# Patient Record
Sex: MALE | Race: WHITE | Employment: OTHER | URBAN - NONMETROPOLITAN AREA
[De-identification: names, ages, dates, MRNs, and addresses within clinical notes are randomized per-mention and may not be internally consistent; named-entity substitution may affect disease eponyms.]

---

## 2018-08-18 ENCOUNTER — APPOINTMENT (OUTPATIENT)
Dept: CT IMAGING | Age: 78
End: 2018-08-18
Payer: MEDICARE

## 2018-08-18 ENCOUNTER — HOSPITAL ENCOUNTER (EMERGENCY)
Age: 78
Discharge: HOME | End: 2018-08-18
Attending: EMERGENCY MEDICINE
Payer: MEDICARE

## 2018-08-18 VITALS
TEMPERATURE: 98.4 F | DIASTOLIC BLOOD PRESSURE: 76 MMHG | OXYGEN SATURATION: 99 % | HEART RATE: 57 BPM | WEIGHT: 182 LBS | RESPIRATION RATE: 16 BRPM | SYSTOLIC BLOOD PRESSURE: 149 MMHG

## 2018-08-18 DIAGNOSIS — N20.0 KIDNEY STONE: Primary | ICD-10-CM

## 2018-08-18 LAB
-: ABNORMAL
ABSOLUTE EOS #: 0.27 K/UL (ref 0–0.44)
ABSOLUTE IMMATURE GRANULOCYTE: <0.03 K/UL (ref 0–0.3)
ABSOLUTE LYMPH #: 0.78 K/UL (ref 1.1–3.7)
ABSOLUTE MONO #: 0.31 K/UL (ref 0.1–1.2)
ALBUMIN SERPL-MCNC: 3.7 G/DL (ref 3.5–5.2)
ALBUMIN/GLOBULIN RATIO: 1.3 (ref 1–2.5)
ALP BLD-CCNC: 70 U/L (ref 40–129)
ALT SERPL-CCNC: <5 U/L (ref 5–41)
AMORPHOUS: ABNORMAL
ANION GAP SERPL CALCULATED.3IONS-SCNC: 12 MMOL/L (ref 9–17)
AST SERPL-CCNC: 13 U/L
BACTERIA: ABNORMAL
BASOPHILS # BLD: 1 % (ref 0–2)
BASOPHILS ABSOLUTE: 0.03 K/UL (ref 0–0.2)
BILIRUB SERPL-MCNC: 0.37 MG/DL (ref 0.3–1.2)
BILIRUBIN URINE: NEGATIVE
BUN BLDV-MCNC: 24 MG/DL (ref 8–23)
BUN/CREAT BLD: 12 (ref 9–20)
CALCIUM SERPL-MCNC: 8.5 MG/DL (ref 8.6–10.4)
CASTS UA: ABNORMAL /LPF
CHLORIDE BLD-SCNC: 104 MMOL/L (ref 98–107)
CO2: 24 MMOL/L (ref 20–31)
COLOR: YELLOW
COMMENT UA: ABNORMAL
CREAT SERPL-MCNC: 1.98 MG/DL (ref 0.7–1.2)
CRYSTALS, UA: ABNORMAL /HPF
DIFFERENTIAL TYPE: ABNORMAL
EOSINOPHILS RELATIVE PERCENT: 4 % (ref 1–4)
EPITHELIAL CELLS UA: ABNORMAL /HPF (ref 0–5)
GFR AFRICAN AMERICAN: 40 ML/MIN
GFR NON-AFRICAN AMERICAN: 33 ML/MIN
GFR SERPL CREATININE-BSD FRML MDRD: ABNORMAL ML/MIN/{1.73_M2}
GFR SERPL CREATININE-BSD FRML MDRD: ABNORMAL ML/MIN/{1.73_M2}
GLUCOSE BLD-MCNC: 163 MG/DL (ref 70–99)
GLUCOSE URINE: NEGATIVE
HCT VFR BLD CALC: 36.3 % (ref 40.7–50.3)
HEMOGLOBIN: 12.1 G/DL (ref 13–17)
IMMATURE GRANULOCYTES: 0 %
KETONES, URINE: NEGATIVE
LEUKOCYTE ESTERASE, URINE: NEGATIVE
LIPASE: 57 U/L (ref 13–60)
LYMPHOCYTES # BLD: 13 % (ref 24–43)
MCH RBC QN AUTO: 28.1 PG (ref 25.2–33.5)
MCHC RBC AUTO-ENTMCNC: 33.3 G/DL (ref 28.4–34.8)
MCV RBC AUTO: 84.4 FL (ref 82.6–102.9)
MONOCYTES # BLD: 5 % (ref 3–12)
MUCUS: ABNORMAL
NITRITE, URINE: NEGATIVE
NRBC AUTOMATED: 0 PER 100 WBC
OTHER OBSERVATIONS UA: ABNORMAL
PDW BLD-RTO: 13.5 % (ref 11.8–14.4)
PH UA: 6 (ref 5–9)
PLATELET # BLD: 234 K/UL (ref 138–453)
PLATELET ESTIMATE: ABNORMAL
PMV BLD AUTO: 9.1 FL (ref 8.1–13.5)
POTASSIUM SERPL-SCNC: 3.6 MMOL/L (ref 3.7–5.3)
PROTEIN UA: NEGATIVE
RBC # BLD: 4.3 M/UL (ref 4.21–5.77)
RBC # BLD: ABNORMAL 10*6/UL
RBC UA: ABNORMAL /HPF (ref 0–2)
RENAL EPITHELIAL, UA: ABNORMAL /HPF
SEG NEUTROPHILS: 77 % (ref 36–65)
SEGMENTED NEUTROPHILS ABSOLUTE COUNT: 4.85 K/UL (ref 1.5–8.1)
SODIUM BLD-SCNC: 140 MMOL/L (ref 135–144)
SPECIFIC GRAVITY UA: >1.03 (ref 1.01–1.02)
TOTAL PROTEIN: 6.6 G/DL (ref 6.4–8.3)
TRICHOMONAS: ABNORMAL
TURBIDITY: CLEAR
URINE HGB: ABNORMAL
UROBILINOGEN, URINE: NORMAL
WBC # BLD: 6.3 K/UL (ref 3.5–11.3)
WBC # BLD: ABNORMAL 10*3/UL
WBC UA: ABNORMAL /HPF (ref 0–5)
YEAST: ABNORMAL

## 2018-08-18 PROCEDURE — 6370000000 HC RX 637 (ALT 250 FOR IP): Performed by: EMERGENCY MEDICINE

## 2018-08-18 PROCEDURE — 87186 SC STD MICRODIL/AGAR DIL: CPT

## 2018-08-18 PROCEDURE — 87077 CULTURE AEROBIC IDENTIFY: CPT

## 2018-08-18 PROCEDURE — 96374 THER/PROPH/DIAG INJ IV PUSH: CPT

## 2018-08-18 PROCEDURE — 2580000003 HC RX 258: Performed by: EMERGENCY MEDICINE

## 2018-08-18 PROCEDURE — 99284 EMERGENCY DEPT VISIT MOD MDM: CPT

## 2018-08-18 PROCEDURE — 6360000002 HC RX W HCPCS: Performed by: EMERGENCY MEDICINE

## 2018-08-18 PROCEDURE — 30300 REMOVE NASAL FOREIGN BODY: CPT

## 2018-08-18 PROCEDURE — 87086 URINE CULTURE/COLONY COUNT: CPT

## 2018-08-18 PROCEDURE — 80053 COMPREHEN METABOLIC PANEL: CPT

## 2018-08-18 PROCEDURE — 36415 COLL VENOUS BLD VENIPUNCTURE: CPT

## 2018-08-18 PROCEDURE — 85025 COMPLETE CBC W/AUTO DIFF WBC: CPT

## 2018-08-18 PROCEDURE — 83690 ASSAY OF LIPASE: CPT

## 2018-08-18 PROCEDURE — 81001 URINALYSIS AUTO W/SCOPE: CPT

## 2018-08-18 PROCEDURE — 74176 CT ABD & PELVIS W/O CONTRAST: CPT

## 2018-08-18 RX ORDER — SIMVASTATIN 40 MG
40 TABLET ORAL NIGHTLY
COMMUNITY
End: 2022-05-28

## 2018-08-18 RX ORDER — LISINOPRIL AND HYDROCHLOROTHIAZIDE 25; 20 MG/1; MG/1
1 TABLET ORAL DAILY
COMMUNITY
End: 2022-05-28

## 2018-08-18 RX ORDER — TAMSULOSIN HYDROCHLORIDE 0.4 MG/1
0.4 CAPSULE ORAL DAILY
Qty: 5 CAPSULE | Refills: 0 | Status: SHIPPED | OUTPATIENT
Start: 2018-08-18 | End: 2018-09-28 | Stop reason: ALTCHOICE

## 2018-08-18 RX ORDER — 0.9 % SODIUM CHLORIDE 0.9 %
1000 INTRAVENOUS SOLUTION INTRAVENOUS ONCE
Status: COMPLETED | OUTPATIENT
Start: 2018-08-18 | End: 2018-08-18

## 2018-08-18 RX ORDER — KETOROLAC TROMETHAMINE 15 MG/ML
15 INJECTION, SOLUTION INTRAMUSCULAR; INTRAVENOUS ONCE
Status: COMPLETED | OUTPATIENT
Start: 2018-08-18 | End: 2018-08-18

## 2018-08-18 RX ORDER — MELOXICAM 15 MG/1
15 TABLET ORAL DAILY
COMMUNITY
End: 2022-05-28

## 2018-08-18 RX ORDER — HYDROCODONE BITARTRATE AND ACETAMINOPHEN 5; 325 MG/1; MG/1
1 TABLET ORAL ONCE
Status: COMPLETED | OUTPATIENT
Start: 2018-08-18 | End: 2018-08-18

## 2018-08-18 RX ORDER — METOPROLOL TARTRATE 100 MG/1
100 TABLET ORAL 2 TIMES DAILY
COMMUNITY
End: 2022-05-28

## 2018-08-18 RX ORDER — ACETAMINOPHEN 500 MG
1000 TABLET ORAL DAILY
COMMUNITY
End: 2022-05-28

## 2018-08-18 RX ORDER — ONDANSETRON 4 MG/1
4 TABLET, ORALLY DISINTEGRATING ORAL EVERY 8 HOURS PRN
Qty: 15 TABLET | Refills: 0 | Status: SHIPPED | OUTPATIENT
Start: 2018-08-18 | End: 2022-05-28

## 2018-08-18 RX ORDER — HYDROCODONE BITARTRATE AND ACETAMINOPHEN 5; 325 MG/1; MG/1
1 TABLET ORAL EVERY 6 HOURS PRN
Qty: 10 TABLET | Refills: 0 | Status: SHIPPED | OUTPATIENT
Start: 2018-08-18 | End: 2018-08-21

## 2018-08-18 RX ORDER — OMEPRAZOLE 20 MG/1
20 CAPSULE, DELAYED RELEASE ORAL DAILY
COMMUNITY
End: 2022-05-28

## 2018-08-18 RX ADMIN — HYDROCODONE BITARTRATE AND ACETAMINOPHEN 1 TABLET: 5; 325 TABLET ORAL at 13:28

## 2018-08-18 RX ADMIN — KETOROLAC TROMETHAMINE 15 MG: 15 INJECTION, SOLUTION INTRAMUSCULAR; INTRAVENOUS at 10:29

## 2018-08-18 RX ADMIN — SODIUM CHLORIDE 1000 ML: 9 INJECTION, SOLUTION INTRAVENOUS at 10:28

## 2018-08-18 ASSESSMENT — ENCOUNTER SYMPTOMS
RESPIRATORY NEGATIVE: 1
GASTROINTESTINAL NEGATIVE: 1

## 2018-08-18 ASSESSMENT — PAIN DESCRIPTION - ORIENTATION: ORIENTATION: LEFT

## 2018-08-18 ASSESSMENT — PAIN DESCRIPTION - LOCATION: LOCATION: FLANK

## 2018-08-18 ASSESSMENT — PAIN - FUNCTIONAL ASSESSMENT: PAIN_FUNCTIONAL_ASSESSMENT: 0-10

## 2018-08-18 ASSESSMENT — PAIN DESCRIPTION - PAIN TYPE: TYPE: ACUTE PAIN

## 2018-08-18 ASSESSMENT — PAIN SCALES - GENERAL
PAINLEVEL_OUTOF10: 4
PAINLEVEL_OUTOF10: 6
PAINLEVEL_OUTOF10: 3
PAINLEVEL_OUTOF10: 6

## 2018-08-18 ASSESSMENT — PAIN DESCRIPTION - DESCRIPTORS: DESCRIPTORS: SHARP

## 2018-08-18 NOTE — ED PROVIDER NOTES
[facility] ED  eMERGENCY dEPARTMENT eNCOUnter      Pt Name: Dianne Elam  MRN: [id number]  Birthdate 1940  Date of evaluation: 8/18/2018  Provider: Darylene Sprain, MD    CHIEF COMPLAINT       Chief Complaint   Patient presents with    Flank Pain     left sided, onset last night         HISTORY OF PRESENT ILLNESS   (Location/Symptom, Timing/Onset, Context/Setting, Quality, Duration, Modifying Factors, Severity)  Note limiting factors. Dianne Elam is a 69 y.o. male who presents to the emergency department      HPI  70 yo male, multiple history of kidney stones, last one @ 7 years. Told he had 7 stones still in his kidney. No fever. No vomiting.  + nausea. NO hematuria. Pain started last night. Nursing Notes were reviewed. REVIEW OF SYSTEMS    (2-9 systems for level 4, 10 or more for level 5)     Review of Systems   Constitutional: Negative. Respiratory: Negative. Cardiovascular: Negative. Gastrointestinal: Negative. Genitourinary: Positive for flank pain. Negative for difficulty urinating, hematuria, scrotal swelling, testicular pain and urgency. Neurological: Negative. Hematological: Negative. Except as noted above the remainder of the review of systems was reviewed and negative.        PAST MEDICAL HISTORY     Past Medical History:   Diagnosis Date    Arthritis     Cancer ([facility] [address]     prostate    Diabetes mellitus ([facility] [address]     Hyperlipidemia     Hypertension     Kidney stone          SURGICAL HISTORY       Past Surgical History:   Procedure Laterality Date    APPENDECTOMY      COLON SURGERY           CURRENT MEDICATIONS       Discharge Medication List as of 8/18/2018 12:45 PM      CONTINUE these medications which have NOT CHANGED    Details   lisinopril-hydrochlorothiazide (PRINZIDE;ZESTORETIC) 20-25 MG per tablet Take 1 tablet by mouth dailyHistorical Med      simvastatin (ZOCOR) 40 MG tablet Take 40 mg by mouth nightlyHistorical Med      metoprolol (LOPRESSOR) 100 MG tablet Take 100 mg by mouth 2 times dailyHistorical Med      omeprazole (PRILOSEC) 20 MG delayed release capsule Take 20 mg by mouth DailyHistorical Med      meloxicam (MOBIC) 15 MG tablet Take 15 mg by mouth dailyHistorical Med      acetaminophen (TYLENOL) 500 MG tablet Take 1,000 mg by mouth dailyHistorical Med             ALLERGIES     Darvon [propoxyphene]; Fentanyl; Morphine; and Pcn [penicillins]    FAMILY HISTORY     History reviewed. No pertinent family history. SOCIAL HISTORY       Social History     Social History    Marital status:      Spouse name: N/A    Number of children: N/A    Years of education: N/A     Social History Main Topics    Smoking status: Never Smoker    Smokeless tobacco: Never Used    Alcohol use None    Drug use: Unknown    Sexual activity: Not Asked     Other Topics Concern    None     Social History Narrative    None       SCREENINGS             PHYSICAL EXAM    (up to 7 for level 4, 8 or more for level 5)     ED Triage Vitals   BP Temp Temp Source Pulse Resp SpO2 Height Weight   08/18/18 0928 08/18/18 0926 08/18/18 0926 08/18/18 0926 08/18/18 0926 08/18/18 0926 -- 08/18/18 0926   (!) 166/69 98.4 °F (36.9 °C) Tympanic 57 16 99 %  182 lb (82.6 kg)       Physical Exam   Constitutional: He is oriented to person, place, and time. He appears well-developed and well-nourished. HENT:   Head: Normocephalic and atraumatic. Right Ear: External ear normal.   Left Ear: External ear normal.   Nose: Nose normal.   Eyes: Pupils are equal, round, and reactive to light. Conjunctivae and EOM are normal.   Neck: Normal range of motion. Neck supple. Cardiovascular: Normal rate, regular rhythm, normal heart sounds and intact distal pulses. Pulmonary/Chest: Effort normal and breath sounds normal.   Abdominal: Soft. Bowel sounds are normal. There is no tenderness (left flank and llq pain). Musculoskeletal: Normal range of motion.    Neurological: He is alert and oriented to person, place, and time. Skin: Skin is warm and dry. DIAGNOSTIC RESULTS     EKG: All EKG's are interpreted by the Emergency Department Physician who either signs or Co-signs this chart in the absence of a cardiologist.      RADIOLOGY:   Non-plain film images such as CT, Ultrasound and MRI are read by the radiologist. Plain radiographic images are visualized and preliminarily interpreted by the emergency physician with the below findings:      Interpretation per the Radiologist below, if available at the time of this note:    CT ABDOMEN PELVIS WO CONTRAST   Final Result   Impression:     3.8 millimeter proximal left ureteral calcification with mild left-sided hydroureteronephrosis. Bilateral nephrolithiasis. Bladder calcification may represent recently passed stone versus primary bladder calcification. Consider urologic consultation. Three vessel coronary artery disease.           Electronically Signed By: Issa Don   on  08/18/2018  10:47            ED BEDSIDE ULTRASOUND:   Performed by ED Physician - none    LABS:  Labs Reviewed   URINE CULTURE - Abnormal; Notable for the following:        Result Value    Culture ESCHERICHIA COLI 10 to 50,000 CFU/ML (*)     All other components within normal limits   CBC WITH AUTO DIFFERENTIAL - Abnormal; Notable for the following:     Hemoglobin 12.1 (*)     Hematocrit 36.3 (*)     Seg Neutrophils 77 (*)     Lymphocytes 13 (*)     Absolute Lymph # 0.78 (*)     All other components within normal limits   COMPREHENSIVE METABOLIC PANEL - Abnormal; Notable for the following:     Glucose 163 (*)     BUN 24 (*)     CREATININE 1.98 (*)     Calcium 8.5 (*)     Potassium 3.6 (*)     ALT <5 (*)     GFR Non- 33 (*)     GFR  40 (*)     All other components within normal limits   URINALYSIS WITH MICROSCOPIC - Abnormal; Notable for the following:     Specific Gravity, UA >1.030 (*)     Urine Hgb 2+ (*)     All dose possible to manage pain., Disp-10 tablet, R-0Print      ondansetron (ZOFRAN ODT) 4 MG disintegrating tablet Take 1 tablet by mouth every 8 hours as needed for Nausea or Vomiting, Disp-15 tablet, R-0Print      tamsulosin (FLOMAX) 0.4 MG capsule Take 1 capsule by mouth daily for 5 doses, Disp-5 capsule, R-0Print             Follow-up:  Lenore Knowles MD  [facility] [phone]              Final Impression:   1.  Kidney stone               (Please note that portions of this note were completed with a voice recognition program.  Efforts were made to edit the dictations but occasionally words are mis-transcribed.)           Harleen Perez MD  08/18/18 [address], [place]  08/26/18 8498

## 2018-08-19 LAB
CULTURE: ABNORMAL
Lab: ABNORMAL
ORGANISM: ABNORMAL
SPECIMEN DESCRIPTION: ABNORMAL
STATUS: ABNORMAL

## 2018-08-20 ENCOUNTER — OFFICE VISIT (OUTPATIENT)
Dept: UROLOGY | Age: 78
End: 2018-08-20
Payer: MEDICARE

## 2018-08-20 ENCOUNTER — HOSPITAL ENCOUNTER (OUTPATIENT)
Age: 78
Discharge: HOME | End: 2018-08-20
Payer: MEDICARE

## 2018-08-20 VITALS
DIASTOLIC BLOOD PRESSURE: 72 MMHG | BODY MASS INDEX: 31.76 KG/M2 | HEIGHT: 64 IN | WEIGHT: 186 LBS | SYSTOLIC BLOOD PRESSURE: 130 MMHG

## 2018-08-20 DIAGNOSIS — N20.0 RENAL CALCULUS: Primary | ICD-10-CM

## 2018-08-20 DIAGNOSIS — N30.00 ACUTE CYSTITIS WITHOUT HEMATURIA: ICD-10-CM

## 2018-08-20 LAB
ANION GAP SERPL CALCULATED.3IONS-SCNC: 9 MMOL/L (ref 9–17)
BUN BLDV-MCNC: 22 MG/DL (ref 8–23)
BUN/CREAT BLD: 11 (ref 9–20)
CALCIUM SERPL-MCNC: 8.1 MG/DL (ref 8.6–10.4)
CHLORIDE BLD-SCNC: 105 MMOL/L (ref 98–107)
CO2: 27 MMOL/L (ref 20–31)
CREAT SERPL-MCNC: 1.97 MG/DL (ref 0.7–1.2)
GFR AFRICAN AMERICAN: 40 ML/MIN
GFR NON-AFRICAN AMERICAN: 33 ML/MIN
GFR SERPL CREATININE-BSD FRML MDRD: ABNORMAL ML/MIN/{1.73_M2}
GFR SERPL CREATININE-BSD FRML MDRD: ABNORMAL ML/MIN/{1.73_M2}
GLUCOSE BLD-MCNC: 103 MG/DL (ref 70–99)
POTASSIUM SERPL-SCNC: 3.6 MMOL/L (ref 3.7–5.3)
SODIUM BLD-SCNC: 141 MMOL/L (ref 135–144)

## 2018-08-20 PROCEDURE — 1123F ACP DISCUSS/DSCN MKR DOCD: CPT | Performed by: UROLOGY

## 2018-08-20 PROCEDURE — 4040F PNEUMOC VAC/ADMIN/RCVD: CPT | Performed by: UROLOGY

## 2018-08-20 PROCEDURE — 99204 OFFICE O/P NEW MOD 45 MIN: CPT | Performed by: UROLOGY

## 2018-08-20 PROCEDURE — G8417 CALC BMI ABV UP PARAM F/U: HCPCS | Performed by: UROLOGY

## 2018-08-20 PROCEDURE — 1036F TOBACCO NON-USER: CPT | Performed by: UROLOGY

## 2018-08-20 PROCEDURE — 80048 BASIC METABOLIC PNL TOTAL CA: CPT

## 2018-08-20 PROCEDURE — G8427 DOCREV CUR MEDS BY ELIG CLIN: HCPCS | Performed by: UROLOGY

## 2018-08-20 PROCEDURE — 36415 COLL VENOUS BLD VENIPUNCTURE: CPT

## 2018-08-20 PROCEDURE — 1101F PT FALLS ASSESS-DOCD LE1/YR: CPT | Performed by: UROLOGY

## 2018-08-20 RX ORDER — CEPHALEXIN 500 MG/1
500 CAPSULE ORAL 3 TIMES DAILY
Qty: 21 CAPSULE | Refills: 0 | Status: SHIPPED | OUTPATIENT
Start: 2018-08-20 | End: 2018-08-27

## 2018-08-20 ASSESSMENT — ENCOUNTER SYMPTOMS
WHEEZING: 0
EYE REDNESS: 0
COUGH: 0
SHORTNESS OF BREATH: 0
VOMITING: 0
EYE PAIN: 0
NAUSEA: 0
COLOR CHANGE: 0
BACK PAIN: 0
ABDOMINAL PAIN: 0

## 2018-08-20 NOTE — PROGRESS NOTES
mouth 2 times daily      omeprazole (PRILOSEC) 20 MG delayed release capsule Take 20 mg by mouth Daily      meloxicam (MOBIC) 15 MG tablet Take 15 mg by mouth daily      acetaminophen (TYLENOL) 500 MG tablet Take 1,000 mg by mouth daily      HYDROcodone-acetaminophen (NORCO) 5-325 MG per tablet Take 1 tablet by mouth every 6 hours as needed for Pain for up to 3 days. Intended supply: 3 days. Take lowest dose possible to manage pain. 10 tablet 0    ondansetron (ZOFRAN ODT) 4 MG disintegrating tablet Take 1 tablet by mouth every 8 hours as needed for Nausea or Vomiting 15 tablet 0    tamsulosin (FLOMAX) 0.4 MG capsule Take 1 capsule by mouth daily for 5 doses 5 capsule 0     No current facility-administered medications on file prior to visit. Outpatient Encounter Prescriptions as of 8/20/2018   Medication Sig Dispense Refill    lisinopril-hydrochlorothiazide (PRINZIDE;ZESTORETIC) 20-25 MG per tablet Take 1 tablet by mouth daily      simvastatin (ZOCOR) 40 MG tablet Take 40 mg by mouth nightly      metoprolol (LOPRESSOR) 100 MG tablet Take 100 mg by mouth 2 times daily      omeprazole (PRILOSEC) 20 MG delayed release capsule Take 20 mg by mouth Daily      meloxicam (MOBIC) 15 MG tablet Take 15 mg by mouth daily      acetaminophen (TYLENOL) 500 MG tablet Take 1,000 mg by mouth daily      HYDROcodone-acetaminophen (NORCO) 5-325 MG per tablet Take 1 tablet by mouth every 6 hours as needed for Pain for up to 3 days. Intended supply: 3 days. Take lowest dose possible to manage pain. 10 tablet 0    ondansetron (ZOFRAN ODT) 4 MG disintegrating tablet Take 1 tablet by mouth every 8 hours as needed for Nausea or Vomiting 15 tablet 0    tamsulosin (FLOMAX) 0.4 MG capsule Take 1 capsule by mouth daily for 5 doses 5 capsule 0     No facility-administered encounter medications on file as of 8/20/2018. Darvon [propoxyphene];  Fentanyl; Morphine; and Pcn [penicillins]  History   Smoking Status    Never Smoker   Smokeless Tobacco    Never Used       History   Alcohol use Not on file       There were no vitals filed for this visit. Constitutional: Patient in no acute distress; Neuro: alert and oriented to person place and time. Psych: Mood and affect normal.  Skin: Normal  Lungs: Respiratory effort normal  Cardiovascular:  Normal peripheral pulses  Abdomen: Soft, non-tender, non-distended with no CVA, flank pain, hepatosplenomegaly or hernia. Kidneys normal.  Bladder non-tender and not distended. Lymphatics: no palpable lymphadenopathy  Penis normal  Urethral meatus normal  Scrotal exam normal  Testicles normal bilaterally  Epididymis normal bilaterally  No evidence of inguinal hernia       No results found for: PSA  Lab Results   Component Value Date    BUN 24 (H) 08/18/2018     Lab Results   Component Value Date    CREATININE 1.98 (H) 08/18/2018       No results found for this visit on 08/20/18. Review of Systems   Constitutional: Negative for appetite change, chills and fever. Eyes: Negative for pain, redness and visual disturbance. Respiratory: Negative for cough, shortness of breath and wheezing. Cardiovascular: Negative for chest pain and leg swelling. Gastrointestinal: Negative for abdominal pain, nausea and vomiting. Genitourinary: Negative for difficulty urinating, discharge, dysuria, flank pain, frequency, hematuria, scrotal swelling and testicular pain. Musculoskeletal: Negative for back pain, joint swelling and myalgias. Skin: Negative for color change, rash and wound. Neurological: Negative for dizziness, tremors and numbness. Hematological: Negative for adenopathy. Does not bruise/bleed easily. Objective:   Physical Exam    Assessment: This is a 69 y.o. male with the following diagnoses:   Diagnosis Orders   1. Renal calculus  XR ABDOMEN (KUB) (SINGLE AP VIEW)   2.  Acute cystitis without hematuria  cephALEXin (KEFLEX) 500 MG capsule         Plan:      We did

## 2018-09-28 ENCOUNTER — HOSPITAL ENCOUNTER (OUTPATIENT)
Age: 78
Discharge: HOME | End: 2018-09-30
Payer: MEDICARE

## 2018-09-28 ENCOUNTER — OFFICE VISIT (OUTPATIENT)
Dept: UROLOGY | Age: 78
End: 2018-09-28
Payer: MEDICARE

## 2018-09-28 ENCOUNTER — HOSPITAL ENCOUNTER (OUTPATIENT)
Age: 78
Setting detail: SPECIMEN
Discharge: HOME | End: 2018-09-28
Payer: MEDICARE

## 2018-09-28 ENCOUNTER — HOSPITAL ENCOUNTER (OUTPATIENT)
Dept: GENERAL RADIOLOGY | Age: 78
Discharge: HOME | End: 2018-09-30
Payer: MEDICARE

## 2018-09-28 ENCOUNTER — TELEPHONE (OUTPATIENT)
Dept: UROLOGY | Age: 78
End: 2018-09-28

## 2018-09-28 VITALS
WEIGHT: 184 LBS | TEMPERATURE: 97.5 F | DIASTOLIC BLOOD PRESSURE: 62 MMHG | BODY MASS INDEX: 31.58 KG/M2 | SYSTOLIC BLOOD PRESSURE: 128 MMHG

## 2018-09-28 DIAGNOSIS — N20.0 RENAL STONE: ICD-10-CM

## 2018-09-28 DIAGNOSIS — N20.0 RENAL CALCULUS: ICD-10-CM

## 2018-09-28 DIAGNOSIS — N20.0 RENAL STONE: Primary | ICD-10-CM

## 2018-09-28 LAB
-: ABNORMAL
AMORPHOUS: ABNORMAL
BACTERIA: ABNORMAL
BILIRUBIN URINE: NEGATIVE
CASTS UA: ABNORMAL /LPF
COLOR: YELLOW
COMMENT UA: ABNORMAL
CRYSTALS, UA: ABNORMAL /HPF
EPITHELIAL CELLS UA: ABNORMAL /HPF (ref 0–5)
GLUCOSE URINE: NEGATIVE
KETONES, URINE: NEGATIVE
LEUKOCYTE ESTERASE, URINE: NEGATIVE
MUCUS: ABNORMAL
NITRITE, URINE: NEGATIVE
OTHER OBSERVATIONS UA: ABNORMAL
PH UA: 5.5 (ref 5–9)
PROTEIN UA: NEGATIVE
RBC UA: ABNORMAL /HPF (ref 0–2)
RENAL EPITHELIAL, UA: ABNORMAL /HPF
SPECIFIC GRAVITY UA: >1.03 (ref 1.01–1.02)
TRICHOMONAS: ABNORMAL
TURBIDITY: CLEAR
URINE HGB: NEGATIVE
UROBILINOGEN, URINE: NORMAL
WBC UA: ABNORMAL /HPF (ref 0–5)
YEAST: ABNORMAL

## 2018-09-28 PROCEDURE — G8427 DOCREV CUR MEDS BY ELIG CLIN: HCPCS | Performed by: NURSE PRACTITIONER

## 2018-09-28 PROCEDURE — 87186 SC STD MICRODIL/AGAR DIL: CPT

## 2018-09-28 PROCEDURE — 1036F TOBACCO NON-USER: CPT | Performed by: NURSE PRACTITIONER

## 2018-09-28 PROCEDURE — 87086 URINE CULTURE/COLONY COUNT: CPT

## 2018-09-28 PROCEDURE — 1123F ACP DISCUSS/DSCN MKR DOCD: CPT | Performed by: NURSE PRACTITIONER

## 2018-09-28 PROCEDURE — 87077 CULTURE AEROBIC IDENTIFY: CPT

## 2018-09-28 PROCEDURE — 99213 OFFICE O/P EST LOW 20 MIN: CPT | Performed by: NURSE PRACTITIONER

## 2018-09-28 PROCEDURE — 81001 URINALYSIS AUTO W/SCOPE: CPT

## 2018-09-28 PROCEDURE — G8417 CALC BMI ABV UP PARAM F/U: HCPCS | Performed by: NURSE PRACTITIONER

## 2018-09-28 PROCEDURE — 1101F PT FALLS ASSESS-DOCD LE1/YR: CPT | Performed by: NURSE PRACTITIONER

## 2018-09-28 PROCEDURE — 4040F PNEUMOC VAC/ADMIN/RCVD: CPT | Performed by: NURSE PRACTITIONER

## 2018-09-28 PROCEDURE — 74018 RADEX ABDOMEN 1 VIEW: CPT

## 2018-09-28 ASSESSMENT — ENCOUNTER SYMPTOMS
EYE PAIN: 0
COUGH: 0
SHORTNESS OF BREATH: 0
ABDOMINAL PAIN: 0
BACK PAIN: 0
CONSTIPATION: 0
COLOR CHANGE: 0
EYE REDNESS: 0
WHEEZING: 0
NAUSEA: 0
VOMITING: 0

## 2018-09-28 NOTE — PROGRESS NOTES
tablet Take 1 tablet by mouth daily      simvastatin (ZOCOR) 40 MG tablet Take 40 mg by mouth nightly      metoprolol (LOPRESSOR) 100 MG tablet Take 100 mg by mouth 2 times daily      omeprazole (PRILOSEC) 20 MG delayed release capsule Take 20 mg by mouth Daily      meloxicam (MOBIC) 15 MG tablet Take 15 mg by mouth daily      acetaminophen (TYLENOL) 500 MG tablet Take 1,000 mg by mouth daily      ondansetron (ZOFRAN ODT) 4 MG disintegrating tablet Take 1 tablet by mouth every 8 hours as needed for Nausea or Vomiting 15 tablet 0    tamsulosin (FLOMAX) 0.4 MG capsule Take 1 capsule by mouth daily for 5 doses 5 capsule 0     No current facility-administered medications on file prior to visit. Outpatient Encounter Prescriptions as of 9/28/2018   Medication Sig Dispense Refill    lisinopril-hydrochlorothiazide (PRINZIDE;ZESTORETIC) 20-25 MG per tablet Take 1 tablet by mouth daily      simvastatin (ZOCOR) 40 MG tablet Take 40 mg by mouth nightly      metoprolol (LOPRESSOR) 100 MG tablet Take 100 mg by mouth 2 times daily      omeprazole (PRILOSEC) 20 MG delayed release capsule Take 20 mg by mouth Daily      meloxicam (MOBIC) 15 MG tablet Take 15 mg by mouth daily      acetaminophen (TYLENOL) 500 MG tablet Take 1,000 mg by mouth daily      ondansetron (ZOFRAN ODT) 4 MG disintegrating tablet Take 1 tablet by mouth every 8 hours as needed for Nausea or Vomiting 15 tablet 0    tamsulosin (FLOMAX) 0.4 MG capsule Take 1 capsule by mouth daily for 5 doses 5 capsule 0     No facility-administered encounter medications on file as of 9/28/2018. Darvon [propoxyphene]; Fentanyl; Morphine; and Pcn [penicillins]  History   Smoking Status    Never Smoker   Smokeless Tobacco    Never Used       History   Alcohol use Not on file       Vitals:    09/28/18 0835   BP: 128/62   Temp: 97.5 °F (36.4 °C)       Constitutional: Patient in no acute distress; Neuro: alert and oriented to person place and time. Psych: Mood and affect normal.  Skin: Normal  Lungs: Respiratory effort normal  Cardiovascular:  Normal peripheral pulses  Abdomen: Soft, non-tender, non-distended with no CVA, flank pain, hepatosplenomegaly or hernia. Kidneys normal.  Bladder non-tender and not distended. Lymphatics: no palpable lymphadenopathy  Penis normal  Urethral meatus normal  Scrotal exam normal  Testicles normal bilaterally      No results found for: PSA  Lab Results   Component Value Date    BUN 22 08/20/2018     Lab Results   Component Value Date    CREATININE 1.97 (H) 08/20/2018       No results found for this visit on 09/28/18. Review of Systems   Constitutional: Negative for appetite change, chills and fever. Eyes: Negative for pain, redness and visual disturbance. Respiratory: Negative for cough, shortness of breath and wheezing. Cardiovascular: Negative for chest pain and leg swelling. Gastrointestinal: Negative for abdominal pain, constipation, nausea and vomiting. Genitourinary: Positive for enuresis (nocturia) and frequency. Negative for decreased urine volume, difficulty urinating, discharge, dysuria, flank pain, hematuria, penile pain, scrotal swelling, testicular pain and urgency. Musculoskeletal: Negative for back pain, joint swelling and myalgias. Skin: Negative for color change, rash and wound. Neurological: Negative for dizziness, tremors and numbness. Hematological: Negative for adenopathy. Does not bruise/bleed easily. Objective:   Physical Exam    Assessment:       Diagnosis Orders   1. Renal stone  XR ABDOMEN (KUB) (SINGLE AP VIEW)           Plan: To prevent future stone formation:  1) drink around 80 ounces of \"good fluids\" daily (water, juice, Gatoraide, milk). Avoid/minimize intake of \"bad fluids\" (soda pop, coffee, tea, alcohol, energy drinks)  2) reduce consumption of sodium/salt  3) reduce consumption of fatty animal protein (full-fat cheese/milk/dairy, red meats, pork).  Limit

## 2018-09-29 LAB
CULTURE: ABNORMAL
Lab: ABNORMAL
ORGANISM: ABNORMAL
SPECIMEN DESCRIPTION: ABNORMAL
STATUS: ABNORMAL

## 2018-10-01 ENCOUNTER — TELEPHONE (OUTPATIENT)
Dept: UROLOGY | Age: 78
End: 2018-10-01

## 2018-10-01 DIAGNOSIS — N18.30 CKD (CHRONIC KIDNEY DISEASE) STAGE 3, GFR 30-59 ML/MIN (HCC): Primary | ICD-10-CM

## 2018-10-02 ENCOUNTER — HOSPITAL ENCOUNTER (OUTPATIENT)
Age: 78
Discharge: HOME | End: 2018-10-02
Payer: MEDICARE

## 2018-10-02 DIAGNOSIS — N30.00 ACUTE CYSTITIS WITHOUT HEMATURIA: ICD-10-CM

## 2018-10-02 LAB
ANION GAP SERPL CALCULATED.3IONS-SCNC: 10 MMOL/L (ref 9–17)
BUN BLDV-MCNC: 27 MG/DL (ref 8–23)
BUN/CREAT BLD: 13 (ref 9–20)
CALCIUM SERPL-MCNC: 8.1 MG/DL (ref 8.6–10.4)
CHLORIDE BLD-SCNC: 105 MMOL/L (ref 98–107)
CO2: 28 MMOL/L (ref 20–31)
CREAT SERPL-MCNC: 2.15 MG/DL (ref 0.7–1.2)
GFR AFRICAN AMERICAN: 36 ML/MIN
GFR NON-AFRICAN AMERICAN: 30 ML/MIN
GFR SERPL CREATININE-BSD FRML MDRD: ABNORMAL ML/MIN/{1.73_M2}
GFR SERPL CREATININE-BSD FRML MDRD: ABNORMAL ML/MIN/{1.73_M2}
GLUCOSE BLD-MCNC: 129 MG/DL (ref 70–99)
POTASSIUM SERPL-SCNC: 3.8 MMOL/L (ref 3.7–5.3)
SODIUM BLD-SCNC: 143 MMOL/L (ref 135–144)

## 2018-10-02 PROCEDURE — 80048 BASIC METABOLIC PNL TOTAL CA: CPT

## 2018-10-02 PROCEDURE — 36415 COLL VENOUS BLD VENIPUNCTURE: CPT

## 2018-10-03 RX ORDER — CIPROFLOXACIN 250 MG/1
250 TABLET, FILM COATED ORAL 2 TIMES DAILY
Qty: 14 TABLET | Refills: 0 | Status: SHIPPED | OUTPATIENT
Start: 2018-10-03 | End: 2018-10-10

## 2018-10-03 NOTE — TELEPHONE ENCOUNTER
I sent in culture specific cipro. Increase water intake. I would advice he STOP mobic. His renal function is worse and mobic is very hard on the kidney. Repeat BMP in 3-5 days.

## 2020-07-20 ENCOUNTER — TELEPHONE (OUTPATIENT)
Dept: GASTROENTEROLOGY | Age: 80
End: 2020-07-20

## 2020-07-23 PROBLEM — R13.10 DYSPHAGIA: Status: ACTIVE | Noted: 2020-07-23

## 2020-08-13 NOTE — TELEPHONE ENCOUNTER
Attempted to schedule with patient. He asked me to contact his wife, Prem Jimenez, to schedule at [phone]. I attempted to contact Prem Jimenez with no answer and no voicemail set up.

## 2020-09-23 ENCOUNTER — TELEPHONE (OUTPATIENT)
Dept: SURGERY | Age: 80
End: 2020-09-23

## 2020-09-23 NOTE — TELEPHONE ENCOUNTER
Contacted patient to schedule EGD with Dr Mecca Roberts. Patient was initially a direct EGD referral to Dr Rochelle Mendoza from PCP, Dr Zachary Rodríguez, due to dyspepsia and gerd. EGD scheduled for 10/27/20 at [facility]. Patient instructed to be NPO after midnight the night prior to the procedure and informed that office would mail patient instructions. Follow-up appointment scheduled for 11/03/20 with Dr Mecca Roberts in [facility] office. Patient advised to contact [facility] surgery office with any further questions or concerns.

## 2020-10-14 ENCOUNTER — HOSPITAL ENCOUNTER (OUTPATIENT)
Age: 80
Discharge: HOME | End: 2020-10-14
Payer: MEDICARE

## 2020-10-14 LAB
EKG ATRIAL RATE: 43 BPM
EKG P AXIS: 64 DEGREES
EKG P-R INTERVAL: 186 MS
EKG Q-T INTERVAL: 472 MS
EKG QRS DURATION: 92 MS
EKG QTC CALCULATION (BAZETT): 398 MS
EKG R AXIS: -14 DEGREES
EKG T AXIS: 32 DEGREES
EKG VENTRICULAR RATE: 43 BPM

## 2020-10-14 PROCEDURE — 93010 ELECTROCARDIOGRAM REPORT: CPT | Performed by: INTERNAL MEDICINE

## 2020-10-14 PROCEDURE — 93005 ELECTROCARDIOGRAM TRACING: CPT

## 2020-10-15 SDOH — HEALTH STABILITY: MENTAL HEALTH: HOW OFTEN DO YOU HAVE A DRINK CONTAINING ALCOHOL?: NEVER

## 2020-10-15 NOTE — PROGRESS NOTES
Patient instructed on the pre-operative, intra-operative, and post-operative process. Patient's surgical procedure and day of surgery confirmed. Patient instructed on NPO status. Medication instructions reviewed with patient. Pre operative instruction sheet reviewed with patient per PAT phone interview. Attempted PAT phone call; no answer; message left to return PAT phone call.

## 2020-10-20 ENCOUNTER — HOSPITAL ENCOUNTER (OUTPATIENT)
Dept: PREADMISSION TESTING | Age: 80
Setting detail: SPECIMEN
Discharge: HOME | End: 2020-10-24
Payer: MEDICARE

## 2020-10-20 LAB
SARS-COV-2, RAPID: NORMAL
SARS-COV-2: NORMAL
SARS-COV-2: NOT DETECTED
SOURCE: NORMAL

## 2020-10-20 PROCEDURE — C9803 HOPD COVID-19 SPEC COLLECT: HCPCS

## 2020-10-20 PROCEDURE — U0003 INFECTIOUS AGENT DETECTION BY NUCLEIC ACID (DNA OR RNA); SEVERE ACUTE RESPIRATORY SYNDROME CORONAVIRUS 2 (SARS-COV-2) (CORONAVIRUS DISEASE [COVID-19]), AMPLIFIED PROBE TECHNIQUE, MAKING USE OF HIGH THROUGHPUT TECHNOLOGIES AS DESCRIBED BY CMS-2020-01-R: HCPCS

## 2020-10-26 ENCOUNTER — ANESTHESIA EVENT (OUTPATIENT)
Dept: OPERATING ROOM | Age: 80
End: 2020-10-26
Payer: MEDICARE

## 2020-10-27 ENCOUNTER — HOSPITAL ENCOUNTER (OUTPATIENT)
Age: 80
Setting detail: OUTPATIENT SURGERY
Discharge: HOME | End: 2020-10-27
Attending: SURGERY | Admitting: SURGERY
Payer: MEDICARE

## 2020-10-27 ENCOUNTER — ANESTHESIA (OUTPATIENT)
Dept: OPERATING ROOM | Age: 80
End: 2020-10-27
Payer: MEDICARE

## 2020-10-27 VITALS
DIASTOLIC BLOOD PRESSURE: 45 MMHG | OXYGEN SATURATION: 97 % | SYSTOLIC BLOOD PRESSURE: 104 MMHG | RESPIRATION RATE: 18 BRPM

## 2020-10-27 VITALS
OXYGEN SATURATION: 98 % | RESPIRATION RATE: 18 BRPM | DIASTOLIC BLOOD PRESSURE: 85 MMHG | SYSTOLIC BLOOD PRESSURE: 171 MMHG | HEIGHT: 65 IN | WEIGHT: 175 LBS | HEART RATE: 60 BPM | TEMPERATURE: 97.5 F | BODY MASS INDEX: 29.16 KG/M2

## 2020-10-27 PROCEDURE — 2580000003 HC RX 258: Performed by: SURGERY

## 2020-10-27 PROCEDURE — 7100000010 HC PHASE II RECOVERY - FIRST 15 MIN: Performed by: SURGERY

## 2020-10-27 PROCEDURE — 6360000002 HC RX W HCPCS: Performed by: NURSE ANESTHETIST, CERTIFIED REGISTERED

## 2020-10-27 PROCEDURE — 3700000000 HC ANESTHESIA ATTENDED CARE: Performed by: SURGERY

## 2020-10-27 PROCEDURE — 3609012400 HC EGD TRANSORAL BIOPSY SINGLE/MULTIPLE: Performed by: SURGERY

## 2020-10-27 PROCEDURE — 88305 TISSUE EXAM BY PATHOLOGIST: CPT

## 2020-10-27 PROCEDURE — 2709999900 HC NON-CHARGEABLE SUPPLY: Performed by: SURGERY

## 2020-10-27 PROCEDURE — 87077 CULTURE AEROBIC IDENTIFY: CPT

## 2020-10-27 PROCEDURE — 99221 1ST HOSP IP/OBS SF/LOW 40: CPT | Performed by: SURGERY

## 2020-10-27 PROCEDURE — 3700000001 HC ADD 15 MINUTES (ANESTHESIA): Performed by: SURGERY

## 2020-10-27 PROCEDURE — C1726 CATH, BAL DIL, NON-VASCULAR: HCPCS | Performed by: SURGERY

## 2020-10-27 PROCEDURE — 3609012700 HC EGD DILATION SAVORY: Performed by: SURGERY

## 2020-10-27 PROCEDURE — 2500000003 HC RX 250 WO HCPCS: Performed by: NURSE ANESTHETIST, CERTIFIED REGISTERED

## 2020-10-27 PROCEDURE — 7100000011 HC PHASE II RECOVERY - ADDTL 15 MIN: Performed by: SURGERY

## 2020-10-27 PROCEDURE — 88342 IMHCHEM/IMCYTCHM 1ST ANTB: CPT

## 2020-10-27 RX ORDER — SODIUM CHLORIDE, SODIUM LACTATE, POTASSIUM CHLORIDE, CALCIUM CHLORIDE 600; 310; 30; 20 MG/100ML; MG/100ML; MG/100ML; MG/100ML
INJECTION, SOLUTION INTRAVENOUS CONTINUOUS
Status: DISCONTINUED | OUTPATIENT
Start: 2020-10-27 | End: 2020-10-27 | Stop reason: HOSPADM

## 2020-10-27 RX ORDER — LIDOCAINE HYDROCHLORIDE 20 MG/ML
INJECTION, SOLUTION EPIDURAL; INFILTRATION; INTRACAUDAL; PERINEURAL PRN
Status: DISCONTINUED | OUTPATIENT
Start: 2020-10-27 | End: 2020-10-27 | Stop reason: SDUPTHER

## 2020-10-27 RX ORDER — SODIUM CHLORIDE 0.9 % (FLUSH) 0.9 %
10 SYRINGE (ML) INJECTION EVERY 12 HOURS SCHEDULED
Status: DISCONTINUED | OUTPATIENT
Start: 2020-10-27 | End: 2020-10-27 | Stop reason: HOSPADM

## 2020-10-27 RX ORDER — SUCRALFATE ORAL 1 G/10ML
1 SUSPENSION ORAL 4 TIMES DAILY
Qty: 420 ML | Refills: 1 | Status: SHIPPED | OUTPATIENT
Start: 2020-10-27 | End: 2022-05-28

## 2020-10-27 RX ORDER — SODIUM CHLORIDE 0.9 % (FLUSH) 0.9 %
10 SYRINGE (ML) INJECTION PRN
Status: DISCONTINUED | OUTPATIENT
Start: 2020-10-27 | End: 2020-10-27 | Stop reason: HOSPADM

## 2020-10-27 RX ORDER — SODIUM CHLORIDE 0.9 % (FLUSH) 0.9 %
10 SYRINGE (ML) INJECTION PRN
Status: CANCELLED | OUTPATIENT
Start: 2020-10-27

## 2020-10-27 RX ORDER — PROPOFOL 10 MG/ML
INJECTION, EMULSION INTRAVENOUS PRN
Status: DISCONTINUED | OUTPATIENT
Start: 2020-10-27 | End: 2020-10-27 | Stop reason: SDUPTHER

## 2020-10-27 RX ORDER — SODIUM CHLORIDE, SODIUM LACTATE, POTASSIUM CHLORIDE, CALCIUM CHLORIDE 600; 310; 30; 20 MG/100ML; MG/100ML; MG/100ML; MG/100ML
INJECTION, SOLUTION INTRAVENOUS CONTINUOUS
Status: CANCELLED | OUTPATIENT
Start: 2020-10-27

## 2020-10-27 RX ORDER — SODIUM CHLORIDE 0.9 % (FLUSH) 0.9 %
10 SYRINGE (ML) INJECTION EVERY 12 HOURS SCHEDULED
Status: CANCELLED | OUTPATIENT
Start: 2020-10-27

## 2020-10-27 RX ADMIN — SODIUM CHLORIDE, POTASSIUM CHLORIDE, SODIUM LACTATE AND CALCIUM CHLORIDE: 600; 310; 30; 20 INJECTION, SOLUTION INTRAVENOUS at 08:18

## 2020-10-27 RX ADMIN — PROPOFOL 50 MG: 10 INJECTION, EMULSION INTRAVENOUS at 08:58

## 2020-10-27 RX ADMIN — PROPOFOL 50 MG: 10 INJECTION, EMULSION INTRAVENOUS at 09:01

## 2020-10-27 RX ADMIN — LIDOCAINE HYDROCHLORIDE 100 MG: 20 INJECTION, SOLUTION EPIDURAL; INFILTRATION; INTRACAUDAL; PERINEURAL at 08:45

## 2020-10-27 RX ADMIN — PROPOFOL 50 MG: 10 INJECTION, EMULSION INTRAVENOUS at 08:54

## 2020-10-27 RX ADMIN — PROPOFOL 50 MG: 10 INJECTION, EMULSION INTRAVENOUS at 08:48

## 2020-10-27 RX ADMIN — PROPOFOL 50 MG: 10 INJECTION, EMULSION INTRAVENOUS at 08:50

## 2020-10-27 ASSESSMENT — PAIN DESCRIPTION - DESCRIPTORS: DESCRIPTORS: CRAMPING;OTHER (COMMENT)

## 2020-10-27 ASSESSMENT — ENCOUNTER SYMPTOMS
SHORTNESS OF BREATH: 0
BLOOD IN STOOL: 0
TROUBLE SWALLOWING: 0
SORE THROAT: 0
BACK PAIN: 0
COUGH: 0
NAUSEA: 0
VOMITING: 0
CHOKING: 1
ABDOMINAL PAIN: 0

## 2020-10-27 ASSESSMENT — PAIN SCALES - GENERAL: PAINLEVEL_OUTOF10: 3

## 2020-10-27 ASSESSMENT — PAIN DESCRIPTION - PAIN TYPE: TYPE: SURGICAL PAIN

## 2020-10-27 ASSESSMENT — PAIN DESCRIPTION - LOCATION: LOCATION: ABDOMEN

## 2020-10-27 ASSESSMENT — PAIN - FUNCTIONAL ASSESSMENT: PAIN_FUNCTIONAL_ASSESSMENT: 0-10

## 2020-10-27 NOTE — ANESTHESIA POSTPROCEDURE EVALUATION
Department of Anesthesiology  Postprocedure Note    Patient: Ayad Zuñiga  MRN: [id number]  YOB: 1940  Date of evaluation: 10/27/2020  Time:  10:49 AM     Procedure Summary     Date:  10/27/20 Room / Location:  [address] [place] [address]    Anesthesia Start:  9231 Anesthesia Stop:  4500    Procedures:       EGD BIOPSY and polypectomy (N/A )      EGD DILATION SAVORY Diagnosis:  (GERD)    Surgeon:  Marcia Arshad MD Responsible Provider:  Sherrine Koyanagi    Anesthesia Type:  TIVA, MAC ASA Status:  3          Anesthesia Type: TIVA, MAC    Eddie Phase I: Eddie Score: 6    Eddie Phase II: Eddie Score: 10    Last vitals: Reviewed and per EMR flowsheets.        Anesthesia Post Evaluation    Patient location during evaluation: PACU  Patient participation: complete - patient participated  Level of consciousness: awake and alert  Pain score: 3  Airway patency: patent  Nausea & Vomiting: no nausea and no vomiting  Complications: no  Cardiovascular status: blood pressure returned to baseline  Respiratory status: acceptable  Hydration status: euvolemic

## 2020-10-27 NOTE — PROGRESS NOTES
Discharge instructions given to patient and patients wife. Both verbalize understanding and denies any questions at this time. Discharge Criteria    Inpatients must meet Criteria 1 through 7. All other patients are either YES or N/A. If a NO is chosen then Anesthesia or Surgeon must be notified. 1.  Minimum 30 minutes after last dose of sedative medication, minimum 120 minutes after last dose of reversal agent. Yes      2. Systolic BP stable within 20 mmHg for 30 minutes & systolic BP between 90 & 315 or within 10 mmHg of baseline. Yes      3. Pulse between 60 and 100 or within 10 bpm of baseline. Yes      4. Spontaneous respiratory rate >/= 10 per minute. Yes      5. SaO2 >/= 95 or  >/= baseline. Yes      6. Able to cough and swallow or return to baseline function. Yes      7. Alert and oriented or return to baseline mental status. Yes      8. Demonstrates controlled, coordinated movements, ambulates with steady gait, or return to baseline activity function. Yes      9. Minimal or no pain or nausea, or at a level tolerable and acceptable to patient. Yes      10. Takes and retains oral fluids as allowed. Yes      11. Procedural / perioperative site stable. Minimal or no bleeding. Yes          12. If GI endoscopy procedure, minimal or no abdominal distention or passing flatus. N/A      13. Written discharge instructions and emergency telephone number provided. Yes      14. Accompanied by a responsible adult.     Yes

## 2020-10-27 NOTE — BRIEF OP NOTE
Brief Postoperative Note      Patient: Ayad Zuñiga  YOB: 1940  MRN: [id number]    Date of Procedure: 10/27/2020    Pre-Op Diagnosis:      1. Dysphagia with GERD    Post-Op Diagnosis:      1. Hiatal hernia Type I  (~4 cm)     2. Distal esophageal web     3. Mild distal esophagitis     4. Mild antral gastritis     5. Fundic polyps       Operation:     1. EGD     2. Savory balloon dilatation, distal esophagus     3. Antral biopsies    Surgeon(s):  Marcia rAshad MD    Assistant:  * No surgical staff found *    Anesthesia: Monitor Anesthesia Care    Estimated Blood Loss (mL): less than 01FP     Complications: None    Specimens:   ID Type Source Tests Collected by Time Destination   1 :  Tissue Stomach H. PYLORI DETECTION Marcia Arshad MD 10/27/2020 0056    A :  Tissue Stomach SURGICAL PATHOLOGY Marcia Arshad MD 10/27/2020 5613    B :  Tissue Gastric SURGICAL PATHOLOGY Marcia Arshad MD 10/27/2020 7987      Findings:  As above.     Dictated # [id number]    Electronically signed by Marcia Arshad MD on 10/27/2020 at 9:06 AM

## 2020-10-27 NOTE — H&P
Amarjit Menard is an 79 y.o.  male. Allergies: Allergies   Allergen Reactions    Pcn [Penicillins]      Facial swelling and difficulty breathing    Darvon [Propoxyphene]     Fentanyl     Morphine        Active Problems:    * No active hospital problems. *  Resolved Problems:    * No resolved hospital problems. *    Blood pressure (!) 175/81, pulse 63, temperature 97.2 °F (36.2 °C), temperature source Temporal, resp. rate 16, height 5' 5\" (1.651 m), weight 175 lb (79.4 kg), SpO2 96 %. HPI    Mr Aaron Guerra is a pleasant 78 yo WM with dysphagia of solid foods. He has a long history of GERD type symptoms. He recently had a piece of meat lodged in his throat for 24 hours, which subsequently passed without intervention. Weight loss of 35 lbs over the last year, intentional, with dietary changes. Daily BM's, loose, occasional diarrhea. No rectal bleeding. Ruptured appendix years ago. Also what sounds to be small bowel obstruction with lysis of adhesions, although records state colon surgery. He is rather emphatic that he has not had any portion of his colon removed. One attempted colonoscopy decades ago, which he was told was unsuccessful due to tortuosity. Father possibly treated for colon cancer, but patient is unsure. Quit tobacco 1990. Review of Systems   Constitutional: Negative for activity change, appetite change, chills, fever and unexpected weight change. HENT: Negative for nosebleeds, sneezing, sore throat and trouble swallowing. Eyes: Negative for visual disturbance. Respiratory: Positive for choking. Negative for cough and shortness of breath. Cardiovascular: Negative for chest pain, palpitations and leg swelling. Gastrointestinal: Negative for abdominal pain, blood in stool, nausea and vomiting. Genitourinary: Negative for dysuria, flank pain and hematuria. Musculoskeletal: Positive for arthralgias. Negative for back pain, gait problem and myalgias. Talks on phone: None     Gets together: None     Attends Jew service: None     Active member of club or organization: None     Attends meetings of clubs or organizations: None     Relationship status: None    Intimate partner violence     Fear of current or ex partner: None     Emotionally abused: None     Physically abused: None     Forced sexual activity: None   Other Topics Concern    None   Social History Narrative    None       Physical Exam  Vitals signs and nursing note reviewed. Constitutional:       Appearance: He is well-developed. HENT:      Head: Normocephalic and atraumatic. Eyes:      General: No scleral icterus. Conjunctiva/sclera: Conjunctivae normal.      Pupils: Pupils are equal, round, and reactive to light. Neck:      Musculoskeletal: Normal range of motion and neck supple. Vascular: No JVD. Trachea: No tracheal deviation. Cardiovascular:      Rate and Rhythm: Normal rate and regular rhythm. Pulmonary:      Effort: Pulmonary effort is normal. No respiratory distress. Chest:      Chest wall: No tenderness. Abdominal:      General: There is no distension. Palpations: Abdomen is soft. There is no mass. Tenderness: There is no abdominal tenderness. There is no guarding or rebound. Musculoskeletal: Normal range of motion. Lymphadenopathy:      Cervical: No cervical adenopathy. Skin:     General: Skin is warm and dry. Findings: No erythema or rash. Neurological:      Mental Status: He is alert and oriented to person, place, and time. Cranial Nerves: No cranial nerve deficit. Psychiatric:         Behavior: Behavior normal.         Thought Content:  Thought content normal.         Judgment: Judgment normal.       CT ABDOMEN PELVIS WO CONTRAST   Status: Final result    Order Providers     100 MD Darlene Cardoso DO            Signed by     Signed  Date/Time   Phone  Pager    Ethel Anne 8/18/2018  10:48  [phone]     Reading Providers     Read  Date  Phone  Pager    Yaakov Stallings  Aug 18, 2018  [phone]     Radiation Dose Estimates     No radiation information found for this patient    Narrative    FINAL REPORT         EXAM:  CT ABDOMEN PELVIS WO CONTRAST         HISTORY:  left flank pain.         TECHNIQUE:  CT evaluation performed of the abdomen and pelvis without IV or oral contrast administration.  Coronal and sagittal imaging also provided for interpretation.         PRIORS:  31 July 2008         FINDINGS:      There is a 3 x 3.8 millimeter calcification of the proximal left ureter best seen on the axial series 2, image 100 with mild left-sided hydroureteronephrosis. There are additional non obstructing caliceal calcifications of the left kidney largest    grouping seen within the midpole anteriorly image 61 measuring 3 x 4 millimeters. Scattered nonobstructing caliceal calcifications are also noted the right kidney posterior midpole series 2, image 71 measuring approximately 1.5 and 1 millimeters. 6.5 x 5     millimeter calcification is noted within the dependent portion of the urinary bladder. The liver, biliary system, pancreas, spleen and adrenal glands are normal without contrast.  The pelvic organs are normal. Atherosclerotic vascular disease. There is    no evidence of mass or fluid collection noted within the abdomen or pelvis without contrast. Coarse prostatic calcifications. The unopacified portions of the gastrointestinal tract are unremarkable. There is no lymphadenopathy. No acute osseous    abnormality. Three-vessel coronary artery disease. Dependent bibasilar atelectasis. Scattered bone islands of the pelvis are unchanged. Multilevel degenerative changes of the spine with moderate severe canal stenosis at L3-4 and L4-5.              Impression    Impression:      3.8 millimeter proximal left ureteral calcification with mild left-sided hydroureteronephrosis.       Bilateral nephrolithiasis.         Bladder calcification may represent recently passed stone versus primary bladder calcification. Consider urologic consultation.         Three vessel coronary artery disease.              Electronically Signed By: Tito Ray   on  08/18/2018  10:47        Assessment:    78 yo WM with history of dysphagia, GERD. Plan: Will proceed with EGD.   Risks, benefits, alternatives thoroughly reviewed and accepted by Mr Sandi Ponce, including GI bleeding, perforation, missed lesions, COVID-19 exposure/infection etc.     Amy Gao MD  10/27/2020

## 2020-10-27 NOTE — ANESTHESIA PRE PROCEDURE
Department of Anesthesiology  Preprocedure Note       Name:  Kailee You   Age:  79 y.o.  :  1940                                          MRN:  [id number]         Date:  10/27/2020      Surgeon: Julio Finney):  Kaleigh Mancia MD    Procedure: Procedure(s):  EGD ESOPHAGOGASTRODUODENOSCOPY    Medications prior to admission:   Prior to Admission medications    Medication Sig Start Date End Date Taking? Authorizing Provider   lisinopril-hydrochlorothiazide (PRINZIDE;ZESTORETIC) 20-25 MG per tablet Take 1 tablet by mouth daily    Historical Provider, MD   simvastatin (ZOCOR) 40 MG tablet Take 40 mg by mouth nightly    Historical Provider, MD   metoprolol (LOPRESSOR) 100 MG tablet Take 100 mg by mouth 2 times daily    Historical Provider, MD   omeprazole (PRILOSEC) 20 MG delayed release capsule Take 20 mg by mouth Daily    Historical Provider, MD   meloxicam (MOBIC) 15 MG tablet Take 15 mg by mouth daily    Historical Provider, MD   acetaminophen (TYLENOL) 500 MG tablet Take 1,000 mg by mouth daily    Historical Provider, MD   ondansetron (ZOFRAN ODT) 4 MG disintegrating tablet Take 1 tablet by mouth every 8 hours as needed for Nausea or Vomiting 18   Monika Moffett MD       Current medications:    Current Facility-Administered Medications   Medication Dose Route Frequency Provider Last Rate Last Dose    lactated ringers infusion   Intravenous Continuous Kaleigh Mancia MD        lactated ringers infusion   Intravenous Continuous Kaleigh Mancia MD        sodium chloride flush 0.9 % injection 10 mL  10 mL Intravenous 2 times per day Kaleigh Mancia MD        sodium chloride flush 0.9 % injection 10 mL  10 mL Intravenous PRN Kaleigh Mancia MD           Allergies:     Allergies   Allergen Reactions    Pcn [Penicillins]      Facial swelling and difficulty breathing    Darvon [Propoxyphene]     Fentanyl     Morphine        Problem List:    Patient Active Problem List   Diagnosis Code    Dysphagia R13.10       Past Medical History:        Diagnosis Date    Arthritis     Cancer ([facility] [address]     prostate    Diabetes mellitus ([facility]     Hyperlipidemia     Hypertension     Kidney stone        Past Surgical History:        Procedure Laterality Date    APPENDECTOMY      COLON SURGERY      COLONOSCOPY      HERNIA REPAIR         Social History:    Social History     Tobacco Use    Smoking status: Former Smoker     Last attempt to quit: 1990     Years since quittin.8    Smokeless tobacco: Never Used   Substance Use Topics    Alcohol use: Never     Frequency: Never                                Counseling given: Not Answered      Vital Signs (Current):   Vitals:    10/15/20 1531 10/27/20 0728   BP:  (!) 175/81   Pulse:  63   Resp:  16   Temp:  36.2 °C (97.2 °F)   TempSrc:  Temporal   SpO2:  96%   Weight: 175 lb (79.4 kg) 175 lb (79.4 kg)   Height: 5' 5\" (1.651 m) 5' 5\" (1.651 m)                                              BP Readings from Last 3 Encounters:   10/27/20 (!) 175/81   18 128/62   18 130/72       NPO Status: Time of last liquid consumption:                         Time of last solid consumption:                         Date of last liquid consumption: 10/26/20                        Date of last solid food consumption: 10/26/20    BMI:   Wt Readings from Last 3 Encounters:   10/27/20 175 lb (79.4 kg)   18 184 lb (83.5 kg)   18 186 lb (84.4 kg)     Body mass index is 29.12 kg/m².     CBC:   Lab Results   Component Value Date    WBC 6.3 2018    RBC 4.30 2018    RBC 4.45 2012    HGB 12.1 2018    HCT 36.3 2018    MCV 84.4 2018    RDW 13.5 2018     2018     2012       CMP:   Lab Results   Component Value Date     10/02/2018    K 3.8 10/02/2018     10/02/2018    CO2 28 10/02/2018    BUN 27 10/02/2018    CREATININE 2.15 10/02/2018    GFRAA 36 10/02/2018    LABGLOM 30 10/02/2018    GLUCOSE 129 10/02/2018    PROT 6.6 08/18/2018    CALCIUM 8.1 10/02/2018    BILITOT 0.37 08/18/2018    ALKPHOS 70 08/18/2018    AST 13 08/18/2018    ALT <5 08/18/2018       POC Tests: No results for input(s): POCGLU, POCNA, POCK, POCCL, POCBUN, POCHEMO, POCHCT in the last 72 hours. Coags: No results found for: PROTIME, INR, APTT    HCG (If Applicable): No results found for: PREGTESTUR, PREGSERUM, HCG, HCGQUANT     ABGs: No results found for: PHART, PO2ART, ZVP7QNO, OSB8FHV, BEART, N0GPHNXO     Type & Screen (If Applicable):  No results found for: LABABO, LABRH    Drug/Infectious Status (If Applicable):  No results found for: HIV, HEPCAB    COVID-19 Screening (If Applicable):   Lab Results   Component Value Date    COVID19 Not Detected 10/20/2020         Anesthesia Evaluation  Patient summary reviewed  Airway: Mallampati: II  TM distance: >3 FB   Neck ROM: full  Mouth opening: > = 3 FB Dental:    (+) upper dentures  Comment: Several missing and decayed teeth on the bottom     Pulmonary:Negative Pulmonary ROS breath sounds clear to auscultation                             Cardiovascular:  Exercise tolerance: good (>4 METS),   (+) hypertension:, past MI (unsure of when he had a heart attack, asymptomatic):,       ECG reviewed  Rhythm: regular  Rate: abnormal           : pt took his metoprolol on 10/23. stopped due to low HR. PE comment: Sinus bradycardia. Rate in the 40s. Neuro/Psych:               GI/Hepatic/Renal:   (+) hiatal hernia, GERD: well controlled,      Liver disease: liver polyps. Renal disease: \"I've had over 40 kidney stones\"      ROS comment: Hx bowel obstruction with surgery to repair . Endo/Other:    (+) DiabetesType II DM, , Cancer: prostrate cancer - \"just watching it\". Cancer: prostrate cancer - \"just watching it\"        Pt had PAT visit. Abdominal:       Abdomen: soft.     Vascular:                                        Anesthesia Plan      TIVA and MAC     ASA 3       Induction: intravenous. Plan discussed with LUDY Myers Flair   10/27/2020

## 2020-10-28 NOTE — OP NOTE
present. Mild distal esophagitis at the GE junction  was also present along with a distal esophageal web. The stomach was  entered, had normal distensibility. On retroflexion, the fundus and  cardia had a few benign appearing fundic polyps, samples of which were  sent for pathology. The body and prepyloric antrum had mild gastritis  without ulceration. Prepyloric antral biopsies were obtained. Pylorus  was patent. The duodenum was entered. First, second, and third  portions appeared normal.  The stomach was decompressed as the scope was  withdrawn. A Savary balloon dilator was then passed under endoscopic  vision across the distal esophageal web. The dilator was inflated  sequentially to 3, 4.5, and 8 atmospheres, held for 2 minutes at each  pressure level. This corresponded to dilatation of 12, 13.5, and 15  [place]. Final dilatation showed a simple passage of the 15-[place]  balloon across the distal esophageal web and GE junction. The balloon  was deflated and removed. Final inspection of the distal esophagus  showed the mucosa to be intact. Minimal bleeding. The stomach was  again decompressed as the scope was removed. The patient tolerated the  procedure well and was transferred to PACU in stable condition. SPECIMENS:  1. Fundic polyp biopsies. 2.  Prepyloric antral biopsies. DRAINS:  None. COMPLICATIONS:  None. DISPOSITION:  To PACU, awake, alert, and stable. Following recovery, we  will discharge the patient home with a bland pureed diet for the next 48  hours followed by resumption of previous diet as tolerated. We will  continue proton pump inhibition. We will add Carafate. Followup will  be with me in one to two weeks to review pathology. We will recommend  interval endoscopy over the next few months for distal esophageal  biopsies as well as possible repeat dilatation depending on the  patient's symptomatology.         ERIC Cardell Hatchet    D: 10/27/2020 9:15:41       T: 10/27/2020 9:20:33     [email or web address]  Job#: [id number]     Doc#: [id number]    CC:  Dr. Florinda Jose

## 2020-10-29 LAB
DIRECT EXAM: NEGATIVE
Lab: NORMAL
SPECIMEN DESCRIPTION: NORMAL
SURGICAL PATHOLOGY REPORT: NORMAL

## 2020-11-03 ENCOUNTER — OFFICE VISIT (OUTPATIENT)
Dept: SURGERY | Age: 80
End: 2020-11-03
Payer: MEDICARE

## 2020-11-03 VITALS
TEMPERATURE: 98.6 F | RESPIRATION RATE: 20 BRPM | HEART RATE: 63 BPM | SYSTOLIC BLOOD PRESSURE: 147 MMHG | BODY MASS INDEX: 30.44 KG/M2 | WEIGHT: 171.8 LBS | DIASTOLIC BLOOD PRESSURE: 83 MMHG | HEIGHT: 63 IN

## 2020-11-03 PROCEDURE — 99211 OFF/OP EST MAY X REQ PHY/QHP: CPT | Performed by: SURGERY

## 2020-11-03 PROCEDURE — G8419 CALC BMI OUT NRM PARAM NOF/U: HCPCS | Performed by: SURGERY

## 2020-11-03 PROCEDURE — 1036F TOBACCO NON-USER: CPT | Performed by: SURGERY

## 2020-11-03 PROCEDURE — 99212 OFFICE O/P EST SF 10 MIN: CPT | Performed by: SURGERY

## 2020-11-03 PROCEDURE — G8484 FLU IMMUNIZE NO ADMIN: HCPCS | Performed by: SURGERY

## 2020-11-03 PROCEDURE — G8427 DOCREV CUR MEDS BY ELIG CLIN: HCPCS | Performed by: SURGERY

## 2020-11-03 PROCEDURE — 4040F PNEUMOC VAC/ADMIN/RCVD: CPT | Performed by: SURGERY

## 2020-11-03 PROCEDURE — 1123F ACP DISCUSS/DSCN MKR DOCD: CPT | Performed by: SURGERY

## 2020-11-03 NOTE — LETTER
[facility] [place] [address] Samantha Yañez  Phone: [phone]  Fax: [phone]    Flakita Newsome MD        November 4, 2020     Paulina Vora, [address] [facility] [address] [facility] [address]    Patient: Vivek Murphy  MR Number: [id number]  YOB: 1940  Date of Visit: 11/3/2020    Dear Dr. Paulina Vora: Thank you for the request for consultation for Vivek Murphy to me for the evaluation of dysphagia, GERD. Below are the relevant portions of my assessment and plan of care. ASSESSMENT       Diagnosis Orders   1. S/P balloon dilatation of esophageal stricture      2. Hiatal hernia      3. Benign gastric polyp      4. Chronic superficial gastritis without bleeding         PLAN     Endoscopic findings and pathology reviewed with Mr Ruedayse Mendy. Dysphagia symptoms markedly improved. Continue PPI. Advance diet as tolerated. Follow up with me prn. If you have questions, please do not hesitate to call me. I look forward to following Pat Toyin along with you.     Sincerely,        Flakita Newsome MD

## 2020-11-04 ASSESSMENT — ENCOUNTER SYMPTOMS
TROUBLE SWALLOWING: 0
SHORTNESS OF BREATH: 0
BACK PAIN: 0
BLOOD IN STOOL: 0
NAUSEA: 0
CHOKING: 0
COUGH: 0
SORE THROAT: 0
ABDOMINAL PAIN: 0
VOMITING: 0

## 2020-11-05 NOTE — PROGRESS NOTES
Olivia Gregory MD  General Surgery, Endoscopy  Chief Medical Officer    [facility] [place]  [address] [facility] [address]  Dept: [phone]  Fax: [address]  Chief Complaint   Patient presents with    Post-Op Check     EGD follow up. Denies dysphagia. States feeling better and still trying to eat softer foods. HPI    Mr Ashu Starkey returns for follow up after EGD. DATE OF PROCEDURE:  10/27/2020     ATTENDING SURGEON:  Leigh Plummer.     PRIMARY CARE PROVIDER:  Dr. Mariela Best.     PREOPERATIVE DIAGNOSIS:  Dysphagia with gastroesophageal reflux  symptoms.     POSTOPERATIVE DIAGNOSES:  1. Hiatal hernia type 1 (approximately 4 cm). 2.  Distal esophageal web. 3.  Mild distal esophagitis. 4.  Mild antral gastritis. 5.  Fundic polyps.     OPERATION PERFORMED:  1. Esophagogastroduodenoscopy. 2.  Savary balloon dilatation, distal esophagus. 3.  Prepyloric antral biopsies    He is doing well. Dysphagia markedly improved. Review of Systems   Constitutional: Negative for activity change, appetite change, chills, fever and unexpected weight change. HENT: Negative for nosebleeds, sneezing, sore throat and trouble swallowing. Eyes: Negative for visual disturbance. Respiratory: Negative for cough, choking and shortness of breath. Cardiovascular: Negative for chest pain, palpitations and leg swelling. Gastrointestinal: Negative for abdominal pain, blood in stool, nausea and vomiting. Genitourinary: Negative for dysuria, flank pain and hematuria. Musculoskeletal: Positive for arthralgias. Negative for back pain, gait problem and myalgias. Allergic/Immunologic: Negative for immunocompromised state. Neurological: Negative for dizziness, seizures, syncope, weakness and headaches. Hematological: Does not bruise/bleed easily. Psychiatric/Behavioral: Negative for confusion and sleep disturbance.        Past Medical History:   Diagnosis Date    Arthritis     Cancer ([facility] [address]     prostate    Diabetes mellitus ([facility] [address] [facility]     Hyperlipidemia     Hypertension     Kidney stone        Past Surgical History:   Procedure Laterality Date    APPENDECTOMY      COLON SURGERY      COLONOSCOPY      HERNIA REPAIR      UPPER GASTROINTESTINAL ENDOSCOPY  10/27/2020    EGD BIOPSY and polypectomy (N/A ) - Dr. Summers Began N/A 10/27/2020    EGD BIOPSY and polypectomy performed by Hubert Oliveira MD at [address]  10/27/2020    EGD DILATION SAVORY performed by Hubert Oliveira MD at [address]       History reviewed. No pertinent family history. Allergies:  See list    Current Outpatient Medications   Medication Sig Dispense Refill    sucralfate (CARAFATE) 1 GM/10ML suspension Take 10 mLs by mouth 4 times daily May substitute 1 gm Carafate tabs, if pharmacy staff instruct patient how to create slurry at home. 420 mL 1    lisinopril-hydrochlorothiazide (PRINZIDE;ZESTORETIC) 20-25 MG per tablet Take 1 tablet by mouth daily      simvastatin (ZOCOR) 40 MG tablet Take 40 mg by mouth nightly      metoprolol (LOPRESSOR) 100 MG tablet Take 100 mg by mouth 2 times daily      omeprazole (PRILOSEC) 20 MG delayed release capsule Take 20 mg by mouth Daily      meloxicam (MOBIC) 15 MG tablet Take 15 mg by mouth daily      acetaminophen (TYLENOL) 500 MG tablet Take 1,000 mg by mouth daily      ondansetron (ZOFRAN ODT) 4 MG disintegrating tablet Take 1 tablet by mouth every 8 hours as needed for Nausea or Vomiting (Patient not taking: Reported on 11/3/2020) 15 tablet 0     No current facility-administered medications for this visit.         Social History     Socioeconomic History    Marital status:      Spouse name: None    Number of children: None    Years of education: None    Highest education level: None   Occupational History    None   Social Needs    Financial resource strain: None   [place] insecurity     Worry: None     Inability: None    Transportation needs     Medical: None     Non-medical: None   Tobacco Use    Smoking status: Former Smoker     Last attempt to quit: 1990     Years since quittin.8    Smokeless tobacco: Never Used   Substance and Sexual Activity    Alcohol use: Never     Frequency: Never    Drug use: Never    Sexual activity: None   Lifestyle    Physical activity     Days per week: None     Minutes per session: None    Stress: None   Relationships    Social connections     Talks on phone: None     Gets together: None     Attends Pentecostalism service: None     Active member of club or organization: None     Attends meetings of clubs or organizations: None     Relationship status: None    Intimate partner violence     Fear of current or ex partner: None     Emotionally abused: None     Physically abused: None     Forced sexual activity: None   Other Topics Concern    None   Social History Narrative    None       BP (!) 147/83 Comment: recheck  Pulse 63   Temp 98.6 °F (37 °C) (Infrared)   Resp 20   Ht 5' 3\" (1.6 m)   Wt 171 lb 12.8 oz (77.9 kg)   BMI 30.43 kg/m²      Physical Exam  Vitals signs and nursing note reviewed. Constitutional:       General: He is not in acute distress. Appearance: He is well-developed. HENT:      Head: Normocephalic and atraumatic. Eyes:      General: No scleral icterus. Conjunctiva/sclera: Conjunctivae normal.      Pupils: Pupils are equal, round, and reactive to light. Neck:      Trachea: No tracheal deviation. Cardiovascular:      Rate and Rhythm: Normal rate. Pulmonary:      Effort: Pulmonary effort is normal. No respiratory distress. Skin:     General: Skin is warm and dry. Neurological:      Mental Status: He is alert and oriented to person, place, and time. Psychiatric:         Behavior: Behavior normal.         Thought Content:  Thought content normal.         Judgment: Judgment normal. IMAGING/LABS    10/29/2020  4:34 PM - Benoit, Lorin Incoming Lab Results From [facility]     Component  Collected  Lab    Surgical Pathology Report  10/27/2020  4:13 PM  Jeff Doe    -- Diagnosis --   1.  GASTRIC ANTRUM, BIOPSY:-MILD CHRONIC GASTRITIS WITH FOCAL MINIMAL   ACTIVITY AND MODERATE EOSINOPHILIA.-H. PYLORI STAIN IS NEGATIVE. CONTROL REACTS AS EXPECTED. 2.  STOMACH, GASTRIC POLYP, BIOPSY:-FUNDIC   GLAND POLYP. Roque Lopez M.D   **Electronically Signed Out**         josh/10/29/2020         Clinical Information   Pre-op Diagnosis:  GERD   Operative Findings:  STOMACH (H. PYLORI DETECTION); ANTRAL BIOPSY;   GASTRIC POLYP   Operation Performed:  EGD, ESOPHAGOGASTRODUODENOSCOPY     Source of Specimen   1: ANTRAL BIOPSY   2: GASTRIC POLYP     Gross Description   1.  \"ALVERTO NAULT, ANTRAL BIOPSY\" Three tan-white tissue fragments   from 0.4 to 0.6 cm and are 1.5 x 0.2 x 0.1 cm in aggregate.  Entirely   1cs. 2.  \"ALVERTO NAULT, GASTRIC POLYP\" Two tan-white tissue fragments each   0.4 cm and are 0.8 x 0.3 x 0.1 cm in aggregate.  Entirely 1cs.  rh tm       Microscopic Description   1, 2.  Microscopic examination performed. SURGICAL PATHOLOGY CONSULTATION         Patient Name: Melissa Fu: [id number]   Path Number: [id number]     [address],  [address]. [place], [address]   [phone]   Fax: [phone]    Testing Performed By     Remington Cee  Name  Director  Address  Valid Date Range    [address], [place]  [address] [address]  08/30/17 0801-Present    Lab and Collection     Surgical Pathology - 10/27/2020   Result Information     Status: Final result (Collected: 10/27/2020 16:13)  Provider Status: Ordered          ASSESSMENT     Diagnosis Orders   1. S/P balloon dilatation of esophageal stricture     2.  Hiatal hernia     3. Benign gastric polyp     4. Chronic superficial gastritis without bleeding       PLAN    Endoscopic findings and pathology reviewed with Mr Diana Jain. Dysphagia symptoms markedly improved. Continue PPI. Advance diet as tolerated. Follow up with me prn.      Leyla Deal MD

## 2020-11-05 NOTE — PATIENT INSTRUCTIONS
Patient Education        Gastritis: Care Instructions  Your Care Instructions     Gastritis is a sore and upset stomach. It happens when something irritates the stomach lining. Many things can cause it. These include an infection such as the flu or something you ate or drank. Medicines or a sore on the lining of the stomach (ulcer) also can cause it. Your belly may bloat and ache. You may belch, vomit, and feel sick to your stomach. You should be able to relieve the problem by taking medicine. And it may help to change your diet. If gastritis lasts, your doctor may prescribe medicine. Follow-up care is a key part of your treatment and safety. Be sure to make and go to all appointments, and call your doctor if you are having problems. It's also a good idea to know your test results and keep a list of the medicines you take. How can you care for yourself at home? · If your doctor prescribed antibiotics, take them as directed. Do not stop taking them just because you feel better. You need to take the full course of antibiotics. · Be safe with medicines. If your doctor prescribed medicine to decrease stomach acid, take it as directed. Call your doctor if you think you are having a problem with your medicine. · Do not take any other medicine, including over-the-counter pain relievers, without talking to your doctor first.  · If your doctor recommends over-the-counter medicine to reduce stomach acid, such as Pepcid AC (famotidine), Prilosec (omeprazole), or Tagamet HB (cimetidine) follow the directions on the label. · Drink plenty of fluids (enough so that your urine is light yellow or clear like water) to prevent dehydration. Choose water and other caffeine-free clear liquids. If you have kidney, heart, or liver disease and have to limit fluids, talk with your doctor before you increase the amount of fluids you drink. · Limit how much alcohol you drink.   · Avoid coffee, tea, cola drinks, chocolate, and other foods with caffeine. They increase stomach acid. When should you call for help? Call 911 anytime you think you may need emergency care. For example, call if:    · You vomit blood or what looks like coffee grounds.     · You pass maroon or very bloody stools. Call your doctor now or seek immediate medical care if:    · You start breathing fast and have not produced urine in the last 8 hours.     · You cannot keep fluids down. Watch closely for changes in your health, and be sure to contact your doctor if:    · You do not get better as expected. Where can you learn more? Go to https://Responsive Sportspepiceweb.[employer]. org and sign in to your [employer]U account. Enter [id number] in the [employer] box to learn more about \"Gastritis: Care Instructions. \"     If you do not have an account, please click on the \"Sign Up Now\" link. Current as of: April 15, 2020               Content Version: 12.6  © 2006-2020 [employer]. Care instructions adapted under license by [facility] ([facility]). If you have questions about a medical condition or this instruction, always ask your healthcare professional. [place] [address] any warranty or liability for your use of this information.

## 2020-12-08 ENCOUNTER — OFFICE VISIT (OUTPATIENT)
Dept: SURGERY | Age: 80
End: 2020-12-08
Payer: MEDICARE

## 2020-12-08 VITALS
HEIGHT: 63 IN | WEIGHT: 172.2 LBS | BODY MASS INDEX: 30.51 KG/M2 | DIASTOLIC BLOOD PRESSURE: 69 MMHG | SYSTOLIC BLOOD PRESSURE: 147 MMHG | HEART RATE: 46 BPM | TEMPERATURE: 98.4 F

## 2020-12-08 PROCEDURE — G8484 FLU IMMUNIZE NO ADMIN: HCPCS | Performed by: SURGERY

## 2020-12-08 PROCEDURE — 99211 OFF/OP EST MAY X REQ PHY/QHP: CPT | Performed by: SURGERY

## 2020-12-08 PROCEDURE — G8417 CALC BMI ABV UP PARAM F/U: HCPCS | Performed by: SURGERY

## 2020-12-08 PROCEDURE — 99213 OFFICE O/P EST LOW 20 MIN: CPT | Performed by: SURGERY

## 2020-12-08 PROCEDURE — 4040F PNEUMOC VAC/ADMIN/RCVD: CPT | Performed by: SURGERY

## 2020-12-08 PROCEDURE — 1036F TOBACCO NON-USER: CPT | Performed by: SURGERY

## 2020-12-08 PROCEDURE — G8427 DOCREV CUR MEDS BY ELIG CLIN: HCPCS | Performed by: SURGERY

## 2020-12-08 PROCEDURE — 1123F ACP DISCUSS/DSCN MKR DOCD: CPT | Performed by: SURGERY

## 2020-12-08 NOTE — LETTER
[facility] SURGERY Part of [place] [address]  Phone: [phone]  Fax: [phone]    Mariaa Larsen MD        December 14, 2020     Kita Friend, [address] [address] [facility] [address]    Patient: Amarjit Menard  MR Number: [id number]  YOB: 1940  Date of Visit: 12/8/2020    Dear Dr. Kita Friend: Thank you for the request for consultation for Amarjit Menard to me for the evaluation of abdominal cramping, constipation. Below are the relevant portions of my assessment and plan of care. ASSESSMENT     Diagnosis Orders   1. Abdominal cramping     2. Constipation, unspecified constipation type         PLAN    Will proceed with colonoscopy. Risks, benefits, alternatives thoroughly reviewed and accepted by Mr Aaron Guerra, including GI bleeding, perforation, missed lesions, COVID-19 exposure/infection, etc.  Discussed importance of increased water intake, healthy high fiber low fat diet with fiber supplementation, appropriate use of Milk of Mag, etc.      If you have questions, please do not hesitate to call me. I look forward to following Ann Mao along with you.     Sincerely,        Mariaa Larsen MD

## 2020-12-14 ASSESSMENT — ENCOUNTER SYMPTOMS
SORE THROAT: 0
CONSTIPATION: 1
VOMITING: 0
TROUBLE SWALLOWING: 0
ABDOMINAL DISTENTION: 1
COUGH: 0
ABDOMINAL PAIN: 1
BACK PAIN: 0
CHOKING: 0
SHORTNESS OF BREATH: 0
NAUSEA: 0
BLOOD IN STOOL: 0

## 2020-12-14 NOTE — PATIENT INSTRUCTIONS
Patient Education        Learning About Colonoscopy  What is a colonoscopy? A colonoscopy is a test (also called a procedure) that lets a doctor look inside your large intestine. The doctor uses a thin, lighted tube called a colonoscope. The doctor uses it to look for small growths called polyps, colon or rectal cancer (colorectal cancer), or other problems like bleeding. During the procedure, the doctor can take samples of tissue. The samples can then be checked for cancer or other conditions. The doctor can also take out polyps. How is a colonoscopy done? This procedure is done in a doctor's office or a clinic or hospital. You will get medicine to help you relax and not feel pain. Some people find that they don't remember having the test because of the medicine. The doctor gently moves the colonoscope, or scope, through the colon. The scope is also a small video camera. It lets the doctor see the colon and take pictures. How do you prepare for the procedure? You need to clean out your colon before the procedure so the doctor can see all of your colon. This process may start a day or two before the test. This depends on which \"colon prep\" your doctor recommends. To clean your colon, you stop eating solid foods and drink only clear liquids. You can have water, tea, coffee, clear juices, clear broths, flavored ice pops, and gelatin (such as Jell-O). Do not drink anything red or purple. The day or night before the procedure, you drink a large amount of a special liquid. This causes loose, frequent stools. You will go to the bathroom a lot. It's very important to drink all of the liquid. If you have problems drinking it, call your doctor. Some people don't go to work or do their usual activities on the day of the prep. Arrange to have someone take you home after the test.  What can you expect after a colonoscopy? Your doctor will tell you when you can eat and do your usual activities.   Drink a lot of fluid after the test to replace the fluids you may have lost during the colon prep. But don't drink alcohol. Your doctor will talk to you about when you'll need your next colonoscopy. The results of your test and your risk for colorectal cancer will help your doctor decide how often you need to be checked. After the test, you may be bloated or have gas pains. You may need to pass gas. If a biopsy was done or a polyp was removed, you may have streaks of blood in your stool (feces) for a few days. If polyps were taken out, your doctor may tell you to avoid taking aspirin and nonsteroidal anti-inflammatory drugs (NSAIDs) for 7 to 14 days. Problems such as heavy rectal bleeding may not occur until several weeks after the test. This isn't common. But it can happen after polyps are removed. Follow-up care is a key part of your treatment and safety. Be sure to make and go to all appointments, and call your doctor if you are having problems. It's also a good idea to know your test results and keep a list of the medicines you take. Where can you learn more? Go to https://redBus.in.[employer]gather. org and sign in to your [employer] account. Enter U999 in the [facility] box to learn more about \"Learning About Colonoscopy. \"     If you do not have an account, please click on the \"Sign Up Now\" link. Current as of: April 29, 2020               Content Version: 12.6  © 7925-6594 [employer]. Care instructions adapted under license by [facility] ([facility]). If you have questions about a medical condition or this instruction, always ask your healthcare professional. [address] any warranty or liability for your use of this information.

## 2020-12-14 NOTE — PROGRESS NOTES
Jose C Retnaa MD  General Surgery, Endoscopy  Chief Medical Officer    [facility] Alvaro Heading  [address] [place] [address]  Dept: [phone]  Fax: [address]  Chief Complaint   Patient presents with    Abdominal Pain     c/o abdominal bloating and discomfort, constipation       HPI    Mr Juliana Cooper returns for evaluation of abdominal cramping. He is a pleasant 78 yo WM whom I saw initially Oct 2020 for dysphagia of solid foods. He has a long history of GERD type symptoms. EGD with balloon dilatation has since relieved these symptoms and he now swallows without difficulty. Weight loss of 35 lbs over the last year, intentional, with dietary changes. Bowel movements have become intermittent, with constipation. No rectal bleeding. Ruptured appendix years ago. Also what sounds to be small bowel obstruction with lysis of adhesions, although records state colon surgery. He is rather emphatic that he has not had any portion of his colon removed. One attempted colonoscopy decades ago, which he was told was unsuccessful due to tortuosity. Father possibly treated for colon cancer, but patient is unsure. Quit tobacco 1990.         Review of Systems   Constitutional: Negative for activity change, appetite change, chills, fever and unexpected weight change. HENT: Negative for nosebleeds, sneezing, sore throat and trouble swallowing. Eyes: Negative for visual disturbance. Respiratory: Negative for cough, choking and shortness of breath. Cardiovascular: Negative for chest pain, palpitations and leg swelling. Gastrointestinal: Positive for abdominal distention, abdominal pain and constipation. Negative for blood in stool, nausea and vomiting. Genitourinary: Negative for dysuria, flank pain and hematuria. Musculoskeletal: Positive for arthralgias. Negative for back pain, gait problem and myalgias.    Allergic/Immunologic: Negative for immunocompromised state.   Neurological: Negative for dizziness, seizures, syncope, weakness and headaches. Hematological: Does not bruise/bleed easily. Psychiatric/Behavioral: Negative for confusion and sleep disturbance. Past Medical History:   Diagnosis Date    Arthritis     Cancer ([facility] [address]     prostate    Diabetes mellitus ([facility] [address]     Hyperlipidemia     Hypertension     Kidney stone        Past Surgical History:   Procedure Laterality Date    APPENDECTOMY      COLON SURGERY      COLONOSCOPY      HERNIA REPAIR      UPPER GASTROINTESTINAL ENDOSCOPY  10/27/2020    EGD BIOPSY and polypectomy (N/A ) - Dr. Jaime Loyola N/A 10/27/2020    EGD BIOPSY and polypectomy performed by Milly Browning MD at [address]  10/27/2020    EGD DILATION SAVORY performed by Milly Browning MD at [address]       No family history on file. Allergies:  See list    Current Outpatient Medications   Medication Sig Dispense Refill    sucralfate (CARAFATE) 1 GM/10ML suspension Take 10 mLs by mouth 4 times daily May substitute 1 gm Carafate tabs, if pharmacy staff instruct patient how to create slurry at home. 420 mL 1    lisinopril-hydrochlorothiazide (PRINZIDE;ZESTORETIC) 20-25 MG per tablet Take 1 tablet by mouth daily      metoprolol (LOPRESSOR) 100 MG tablet Take 100 mg by mouth 2 times daily      omeprazole (PRILOSEC) 20 MG delayed release capsule Take 20 mg by mouth Daily      meloxicam (MOBIC) 15 MG tablet Take 15 mg by mouth daily      acetaminophen (TYLENOL) 500 MG tablet Take 1,000 mg by mouth daily      simvastatin (ZOCOR) 40 MG tablet Take 40 mg by mouth nightly      ondansetron (ZOFRAN ODT) 4 MG disintegrating tablet Take 1 tablet by mouth every 8 hours as needed for Nausea or Vomiting (Patient not taking: Reported on 11/3/2020) 15 tablet 0     No current facility-administered medications for this visit.         Social History     Socioeconomic History    Marital status:      Spouse name: Not on file    Number of children: Not on file    Years of education: Not on file    Highest education level: Not on file   Occupational History    Not on file   Social Needs    Financial resource strain: Not on file    Food insecurity     Worry: Not on file     Inability: Not on file    Transportation needs     Medical: Not on file     Non-medical: Not on file   Tobacco Use    Smoking status: Former Smoker     Quit date:      Years since quittin.9    Smokeless tobacco: Never Used   Substance and Sexual Activity    Alcohol use: Never     Frequency: Never    Drug use: Never    Sexual activity: Not on file   Lifestyle    Physical activity     Days per week: Not on file     Minutes per session: Not on file    Stress: Not on file   Relationships    Social connections     Talks on phone: Not on file     Gets together: Not on file     Attends Christian service: Not on file     Active member of club or organization: Not on file     Attends meetings of clubs or organizations: Not on file     Relationship status: Not on file    Intimate partner violence     Fear of current or ex partner: Not on file     Emotionally abused: Not on file     Physically abused: Not on file     Forced sexual activity: Not on file   Other Topics Concern    Not on file   Social History Narrative    Not on file       BP (!) 147/69 (Site: Left Upper Arm, Position: Sitting, Cuff Size: Large Adult)   Pulse (!) 46   Temp 98.4 °F (36.9 °C) (Infrared)   Ht 5' 3\" (1.6 m)   Wt 172 lb 3.2 oz (78.1 kg)   BMI 30.50 kg/m²      Physical Exam  Vitals signs and nursing note reviewed. Constitutional:       Appearance: He is well-developed. HENT:      Head: Normocephalic and atraumatic. Eyes:      General: No scleral icterus. Conjunctiva/sclera: Conjunctivae normal.      Pupils: Pupils are equal, round, and reactive to light.    Neck:      Musculoskeletal: Normal range of motion and neck supple. Vascular: No JVD. Trachea: No tracheal deviation. Cardiovascular:      Rate and Rhythm: Normal rate and regular rhythm. Pulmonary:      Effort: Pulmonary effort is normal. No respiratory distress. Chest:      Chest wall: No tenderness. Abdominal:      General: There is no distension. Palpations: Abdomen is soft. There is no mass. Tenderness: There is no abdominal tenderness. There is no guarding or rebound. Musculoskeletal: Normal range of motion. Lymphadenopathy:      Cervical: No cervical adenopathy. Skin:     General: Skin is warm and dry. Findings: No erythema or rash. Neurological:      Mental Status: He is alert and oriented to person, place, and time. Cranial Nerves: No cranial nerve deficit. Psychiatric:         Behavior: Behavior normal.         Thought Content: Thought content normal.         Judgment: Judgment normal.         IMAGING/LABS    10/29/2020  4:34 PM - Lorin Laboy Incoming Lab Results From [facility]    Component Collected Lab   Surgical Pathology Report 10/27/2020  4:13  Jacob    -- Diagnosis --   1.  GASTRIC ANTRUM, BIOPSY:-MILD CHRONIC GASTRITIS WITH FOCAL MINIMAL   ACTIVITY AND MODERATE EOSINOPHILIA.-H. PYLORI STAIN IS NEGATIVE. CONTROL REACTS AS EXPECTED. 2.  STOMACH, GASTRIC POLYP, BIOPSY:-FUNDIC   GLAND POLYP. Anahy Knapp M.D   **Electronically Signed Out**         josh/10/29/2020         Clinical Information   Pre-op Diagnosis:  GERD   Operative Findings:  STOMACH (H. PYLORI DETECTION); ANTRAL BIOPSY;   GASTRIC POLYP   Operation Performed:  EGD, ESOPHAGOGASTRODUODENOSCOPY     Source of Specimen   1: ANTRAL BIOPSY   2: GASTRIC POLYP     Gross Description   1.  \"ALVERTO NAULT, ANTRAL BIOPSY\" Three tan-white tissue fragments   from 0.4 to 0.6 cm and are 1.5 x 0.2 x 0.1 cm in aggregate.  Entirely   1cs.    2.  \"ALVERTO NAULT, GASTRIC POLYP\" Two tan-white tissue fragments each   0.4 cm and are 0.8 x 0.3 x 0.1 cm in aggregate.  Entirely 1cs.  rh tm       Microscopic Description   1, 2.  Microscopic examination performed. SURGICAL PATHOLOGY CONSULTATION         Patient Name: Linda Batista: [id number]   Path Number: [id number]     [address], [address]. [place], [address]   [phone]   Fax: [phone]    Testing Performed By    Remington Cee Name Director Address Valid Date Range   [facility]  [address] [address] 08/30/17 0801-Present   Lab and Collection    Surgical Pathology - 10/27/2020  Result Information    Status: Final result (Collected: 10/27/2020 16:13) Provider Status: Ordered         ASSESSMENT     Diagnosis Orders   1. Abdominal cramping     2. Constipation, unspecified constipation type         PLAN    Will proceed with colonoscopy.   Risks, benefits, alternatives thoroughly reviewed and accepted by Mr Aaron Guerra, including GI bleeding, perforation, missed lesions, COVID-19 exposure/infection, etc.  Discussed importance of increased water intake, healthy high fiber low fat diet with fiber supplementation, appropriate use of Milk of Mag, etc.       Mariaa Larsen MD

## 2022-01-09 DIAGNOSIS — U07.1 COVID-19: ICD-10-CM

## 2022-01-09 RX ORDER — ALBUTEROL SULFATE 90 UG/1
4 AEROSOL, METERED RESPIRATORY (INHALATION) PRN
Status: CANCELLED | OUTPATIENT
Start: 2022-01-09

## 2022-01-09 RX ORDER — SODIUM CHLORIDE 9 MG/ML
25 INJECTION, SOLUTION INTRAVENOUS PRN
Status: CANCELLED | OUTPATIENT
Start: 2022-01-09

## 2022-01-09 RX ORDER — ONDANSETRON 2 MG/ML
8 INJECTION INTRAMUSCULAR; INTRAVENOUS
Status: CANCELLED | OUTPATIENT
Start: 2022-01-09

## 2022-01-09 RX ORDER — EPINEPHRINE 1 MG/ML
0.3 INJECTION, SOLUTION, CONCENTRATE INTRAVENOUS PRN
Status: CANCELLED | OUTPATIENT
Start: 2022-01-09

## 2022-01-09 RX ORDER — ACETAMINOPHEN 325 MG/1
650 TABLET ORAL
Status: CANCELLED | OUTPATIENT
Start: 2022-01-09

## 2022-01-09 RX ORDER — HEPARIN SODIUM (PORCINE) LOCK FLUSH IV SOLN 100 UNIT/ML 100 UNIT/ML
500 SOLUTION INTRAVENOUS PRN
Status: CANCELLED | OUTPATIENT
Start: 2022-01-09

## 2022-01-09 RX ORDER — DIPHENHYDRAMINE HYDROCHLORIDE 50 MG/ML
50 INJECTION INTRAMUSCULAR; INTRAVENOUS
Status: CANCELLED | OUTPATIENT
Start: 2022-01-09

## 2022-01-09 RX ORDER — SODIUM CHLORIDE 9 MG/ML
INJECTION, SOLUTION INTRAVENOUS CONTINUOUS
Status: CANCELLED | OUTPATIENT
Start: 2022-01-09

## 2022-01-09 RX ORDER — SODIUM CHLORIDE 0.9 % (FLUSH) 0.9 %
5-40 SYRINGE (ML) INJECTION PRN
Status: CANCELLED | OUTPATIENT
Start: 2022-01-09

## 2022-01-10 ENCOUNTER — HOSPITAL ENCOUNTER (OUTPATIENT)
Dept: INFUSION THERAPY | Age: 82
Discharge: HOME | End: 2022-01-10
Payer: MEDICARE

## 2022-01-10 VITALS
OXYGEN SATURATION: 99 % | RESPIRATION RATE: 20 BRPM | TEMPERATURE: 95.8 F | DIASTOLIC BLOOD PRESSURE: 91 MMHG | HEART RATE: 58 BPM | SYSTOLIC BLOOD PRESSURE: 168 MMHG

## 2022-01-10 DIAGNOSIS — U07.1 COVID-19: Primary | ICD-10-CM

## 2022-01-10 PROCEDURE — 6360000002 HC RX W HCPCS: Performed by: FAMILY MEDICINE

## 2022-01-10 PROCEDURE — 2500000003 HC RX 250 WO HCPCS: Performed by: FAMILY MEDICINE

## 2022-01-10 PROCEDURE — 2580000003 HC RX 258: Performed by: FAMILY MEDICINE

## 2022-01-10 PROCEDURE — M0245 HC IV INFUSION BAMLANIVIMAB & ETESEVIMAB W/MONITORING: HCPCS

## 2022-01-10 RX ORDER — ACETAMINOPHEN 325 MG/1
650 TABLET ORAL
OUTPATIENT
Start: 2022-01-10

## 2022-01-10 RX ORDER — METHYLPREDNISOLONE 4 MG/1
4 TABLET ORAL 2 TIMES DAILY
COMMUNITY
Start: 2022-01-05 | End: 2022-05-28

## 2022-01-10 RX ORDER — SODIUM CHLORIDE 9 MG/ML
INJECTION, SOLUTION INTRAVENOUS CONTINUOUS
Status: CANCELLED | OUTPATIENT
Start: 2022-01-10

## 2022-01-10 RX ORDER — ONDANSETRON 2 MG/ML
8 INJECTION INTRAMUSCULAR; INTRAVENOUS
OUTPATIENT
Start: 2022-01-10

## 2022-01-10 RX ORDER — HEPARIN SODIUM (PORCINE) LOCK FLUSH IV SOLN 100 UNIT/ML 100 UNIT/ML
500 SOLUTION INTRAVENOUS PRN
OUTPATIENT
Start: 2022-01-10

## 2022-01-10 RX ORDER — SODIUM CHLORIDE 0.9 % (FLUSH) 0.9 %
5-40 SYRINGE (ML) INJECTION PRN
Status: DISCONTINUED | OUTPATIENT
Start: 2022-01-10 | End: 2022-01-11 | Stop reason: HOSPADM

## 2022-01-10 RX ORDER — ALBUTEROL SULFATE 90 UG/1
4 AEROSOL, METERED RESPIRATORY (INHALATION) PRN
OUTPATIENT
Start: 2022-01-10

## 2022-01-10 RX ORDER — SODIUM CHLORIDE 9 MG/ML
INJECTION, SOLUTION INTRAVENOUS CONTINUOUS
Status: DISCONTINUED | OUTPATIENT
Start: 2022-01-10 | End: 2022-01-11 | Stop reason: HOSPADM

## 2022-01-10 RX ORDER — AZITHROMYCIN 250 MG/1
250 TABLET, FILM COATED ORAL 2 TIMES DAILY
COMMUNITY
Start: 2022-01-05 | End: 2022-05-28

## 2022-01-10 RX ORDER — SODIUM CHLORIDE 0.9 % (FLUSH) 0.9 %
5-40 SYRINGE (ML) INJECTION PRN
Status: CANCELLED | OUTPATIENT
Start: 2022-01-10

## 2022-01-10 RX ORDER — SODIUM CHLORIDE 9 MG/ML
INJECTION, SOLUTION INTRAVENOUS CONTINUOUS
OUTPATIENT
Start: 2022-01-10

## 2022-01-10 RX ORDER — SODIUM CHLORIDE 9 MG/ML
25 INJECTION, SOLUTION INTRAVENOUS PRN
OUTPATIENT
Start: 2022-01-10

## 2022-01-10 RX ORDER — EPINEPHRINE 1 MG/ML
0.3 INJECTION, SOLUTION, CONCENTRATE INTRAVENOUS PRN
OUTPATIENT
Start: 2022-01-10

## 2022-01-10 RX ORDER — DIPHENHYDRAMINE HYDROCHLORIDE 50 MG/ML
50 INJECTION INTRAMUSCULAR; INTRAVENOUS
OUTPATIENT
Start: 2022-01-10

## 2022-01-10 RX ADMIN — SODIUM CHLORIDE: 9 INJECTION, SOLUTION INTRAVENOUS at 08:14

## 2022-01-10 RX ADMIN — SODIUM CHLORIDE: 9 INJECTION, SOLUTION INTRAVENOUS at 08:15

## 2022-01-10 RX ADMIN — SODIUM CHLORIDE, PRESERVATIVE FREE 10 ML: 5 INJECTION INTRAVENOUS at 08:00

## 2022-01-10 NOTE — PROGRESS NOTES
Patient presents for scheduled monoclonal antibody infusion. Confirmed meets criteria. VSS. Educated. Infusion in progress and tolerating well.  Agrees to get the monoclonial antibody

## 2022-05-28 ENCOUNTER — APPOINTMENT (OUTPATIENT)
Dept: GENERAL RADIOLOGY | Age: 82
End: 2022-05-28
Payer: MEDICARE

## 2022-05-28 ENCOUNTER — HOSPITAL ENCOUNTER (EMERGENCY)
Age: 82
Discharge: HOME | End: 2022-05-28
Attending: EMERGENCY MEDICINE
Payer: MEDICARE

## 2022-05-28 ENCOUNTER — APPOINTMENT (OUTPATIENT)
Dept: NUCLEAR MEDICINE | Age: 82
End: 2022-05-28
Payer: MEDICARE

## 2022-05-28 VITALS
DIASTOLIC BLOOD PRESSURE: 62 MMHG | HEIGHT: 62 IN | HEART RATE: 52 BPM | OXYGEN SATURATION: 97 % | RESPIRATION RATE: 12 BRPM | BODY MASS INDEX: 32.39 KG/M2 | WEIGHT: 176 LBS | SYSTOLIC BLOOD PRESSURE: 116 MMHG

## 2022-05-28 DIAGNOSIS — R05.9 COUGH: Primary | ICD-10-CM

## 2022-05-28 DIAGNOSIS — R07.9 CHEST PAIN, UNSPECIFIED TYPE: ICD-10-CM

## 2022-05-28 LAB
ABSOLUTE EOS #: 0.43 K/UL (ref 0–0.44)
ABSOLUTE IMMATURE GRANULOCYTE: <0.03 K/UL (ref 0–0.3)
ABSOLUTE LYMPH #: 1.19 K/UL (ref 1.1–3.7)
ABSOLUTE MONO #: 0.55 K/UL (ref 0.1–1.2)
ALBUMIN SERPL-MCNC: 3.9 G/DL (ref 3.5–5.2)
ALBUMIN/GLOBULIN RATIO: 1.7 (ref 1–2.5)
ALP BLD-CCNC: 95 U/L (ref 40–129)
ALT SERPL-CCNC: 10 U/L (ref 5–41)
ANION GAP SERPL CALCULATED.3IONS-SCNC: 9 MMOL/L (ref 9–17)
AST SERPL-CCNC: 13 U/L
BASOPHILS # BLD: 1 % (ref 0–2)
BASOPHILS ABSOLUTE: 0.05 K/UL (ref 0–0.2)
BILIRUB SERPL-MCNC: 0.19 MG/DL (ref 0.3–1.2)
BUN BLDV-MCNC: 30 MG/DL (ref 8–23)
BUN/CREAT BLD: 14 (ref 9–20)
CALCIUM SERPL-MCNC: 8.2 MG/DL (ref 8.6–10.4)
CHLORIDE BLD-SCNC: 108 MMOL/L (ref 98–107)
CO2: 26 MMOL/L (ref 20–31)
CREAT SERPL-MCNC: 2.21 MG/DL (ref 0.7–1.2)
D-DIMER QUANTITATIVE: 1.05 MG/L FEU (ref 0–0.59)
EKG ATRIAL RATE: 56 BPM
EKG P AXIS: 57 DEGREES
EKG P-R INTERVAL: 196 MS
EKG Q-T INTERVAL: 432 MS
EKG QRS DURATION: 94 MS
EKG QTC CALCULATION (BAZETT): 416 MS
EKG R AXIS: -7 DEGREES
EKG T AXIS: 22 DEGREES
EKG VENTRICULAR RATE: 56 BPM
EOSINOPHILS RELATIVE PERCENT: 6 % (ref 1–4)
GFR AFRICAN AMERICAN: 35 ML/MIN
GFR NON-AFRICAN AMERICAN: 29 ML/MIN
GFR SERPL CREATININE-BSD FRML MDRD: ABNORMAL ML/MIN/{1.73_M2}
GFR SERPL CREATININE-BSD FRML MDRD: ABNORMAL ML/MIN/{1.73_M2}
GLUCOSE BLD-MCNC: 120 MG/DL (ref 70–99)
HCT VFR BLD CALC: 36.2 % (ref 40.7–50.3)
HEMOGLOBIN: 11.6 G/DL (ref 13–17)
IMMATURE GRANULOCYTES: 0 %
LYMPHOCYTES # BLD: 16 % (ref 24–43)
MAGNESIUM: 2 MG/DL (ref 1.6–2.6)
MCH RBC QN AUTO: 27.6 PG (ref 25.2–33.5)
MCHC RBC AUTO-ENTMCNC: 32 G/DL (ref 28.4–34.8)
MCV RBC AUTO: 86 FL (ref 82.6–102.9)
MONOCYTES # BLD: 8 % (ref 3–12)
NRBC AUTOMATED: 0 PER 100 WBC
PDW BLD-RTO: 13.3 % (ref 11.8–14.4)
PLATELET # BLD: 224 K/UL (ref 138–453)
PMV BLD AUTO: 9.2 FL (ref 8.1–13.5)
POTASSIUM SERPL-SCNC: 3.5 MMOL/L (ref 3.7–5.3)
PRO-BNP: 394 PG/ML
RBC # BLD: 4.21 M/UL (ref 4.21–5.77)
SARS-COV-2, RAPID: NOT DETECTED
SEG NEUTROPHILS: 69 % (ref 36–65)
SEGMENTED NEUTROPHILS ABSOLUTE COUNT: 5.11 K/UL (ref 1.5–8.1)
SODIUM BLD-SCNC: 143 MMOL/L (ref 135–144)
SPECIMEN DESCRIPTION: NORMAL
TOTAL PROTEIN: 6.2 G/DL (ref 6.4–8.3)
TROPONIN, HIGH SENSITIVITY: 23 NG/L (ref 0–22)
TROPONIN, HIGH SENSITIVITY: 24 NG/L (ref 0–22)
WBC # BLD: 7.3 K/UL (ref 3.5–11.3)

## 2022-05-28 PROCEDURE — 83735 ASSAY OF MAGNESIUM: CPT

## 2022-05-28 PROCEDURE — 93005 ELECTROCARDIOGRAM TRACING: CPT | Performed by: EMERGENCY MEDICINE

## 2022-05-28 PROCEDURE — 96374 THER/PROPH/DIAG INJ IV PUSH: CPT

## 2022-05-28 PROCEDURE — 85025 COMPLETE CBC W/AUTO DIFF WBC: CPT

## 2022-05-28 PROCEDURE — 99285 EMERGENCY DEPT VISIT HI MDM: CPT

## 2022-05-28 PROCEDURE — 71045 X-RAY EXAM CHEST 1 VIEW: CPT

## 2022-05-28 PROCEDURE — 80053 COMPREHEN METABOLIC PANEL: CPT

## 2022-05-28 PROCEDURE — 3430000000 HC RX DIAGNOSTIC RADIOPHARMACEUTICAL: Performed by: EMERGENCY MEDICINE

## 2022-05-28 PROCEDURE — A9540 TC99M MAA: HCPCS | Performed by: EMERGENCY MEDICINE

## 2022-05-28 PROCEDURE — A9539 TC99M PENTETATE: HCPCS | Performed by: EMERGENCY MEDICINE

## 2022-05-28 PROCEDURE — 83880 ASSAY OF NATRIURETIC PEPTIDE: CPT

## 2022-05-28 PROCEDURE — 87635 SARS-COV-2 COVID-19 AMP PRB: CPT

## 2022-05-28 PROCEDURE — 93010 ELECTROCARDIOGRAM REPORT: CPT | Performed by: INTERNAL MEDICINE

## 2022-05-28 PROCEDURE — 84484 ASSAY OF TROPONIN QUANT: CPT

## 2022-05-28 PROCEDURE — 36415 COLL VENOUS BLD VENIPUNCTURE: CPT

## 2022-05-28 PROCEDURE — 78582 LUNG VENTILAT&PERFUS IMAGING: CPT

## 2022-05-28 PROCEDURE — 85379 FIBRIN DEGRADATION QUANT: CPT

## 2022-05-28 RX ORDER — KIT FOR THE PREPARATION OF TECHNETIUM TC 99M PENTETATE 20 MG/1
35 INJECTION, POWDER, LYOPHILIZED, FOR SOLUTION INTRAVENOUS; RESPIRATORY (INHALATION)
Status: COMPLETED | OUTPATIENT
Start: 2022-05-28 | End: 2022-05-28

## 2022-05-28 RX ORDER — TRAMADOL HYDROCHLORIDE 50 MG/1
50 TABLET ORAL EVERY 6 HOURS PRN
Qty: 10 TABLET | Refills: 0 | Status: SHIPPED | OUTPATIENT
Start: 2022-05-28 | End: 2022-05-31

## 2022-05-28 RX ORDER — AZITHROMYCIN 250 MG/1
TABLET, FILM COATED ORAL
Qty: 1 PACKET | Refills: 0 | Status: SHIPPED | OUTPATIENT
Start: 2022-05-28 | End: 2022-06-01

## 2022-05-28 RX ADMIN — Medication 5 MILLICURIE: at 07:14

## 2022-05-28 RX ADMIN — KIT FOR THE PREPARATION OF TECHNETIUM TC 99M PENTETATE 35 MILLICURIE: 20 INJECTION, POWDER, LYOPHILIZED, FOR SOLUTION INTRAVENOUS; RESPIRATORY (INHALATION) at 07:00

## 2022-05-28 ASSESSMENT — LIFESTYLE VARIABLES: HOW OFTEN DO YOU HAVE A DRINK CONTAINING ALCOHOL: NEVER

## 2022-05-28 ASSESSMENT — ENCOUNTER SYMPTOMS
SORE THROAT: 0
NAUSEA: 0
SHORTNESS OF BREATH: 1
ABDOMINAL PAIN: 0
VOMITING: 0
COUGH: 1
RHINORRHEA: 0

## 2022-05-28 ASSESSMENT — PAIN SCALES - GENERAL: PAINLEVEL_OUTOF10: 2

## 2022-05-28 ASSESSMENT — PAIN DESCRIPTION - PAIN TYPE: TYPE: ACUTE PAIN

## 2022-05-28 ASSESSMENT — PAIN - FUNCTIONAL ASSESSMENT: PAIN_FUNCTIONAL_ASSESSMENT: 0-10

## 2022-05-28 ASSESSMENT — PAIN DESCRIPTION - LOCATION: LOCATION: CHEST

## 2022-05-28 NOTE — ED PROVIDER NOTES
[facility] ED  EMERGENCY DEPARTMENT ENCOUNTER      Signout received from Dr. Hank Steinberg. Ventilation/perfusion scan pending at time of signout. Patient presented with pleuritic type chest pain and cough. Laboratory studies unchanged from baseline and x-ray unremarkable. Patient did have previous COVID infection and elevated D-dimer. VQ scan was ordered to rule out pulmonary embolism. Radiology read VQ scan low probability for pulmonary embolism. She is resting comfortably. Lungs are clear. Respirations nonlabored. Patient has had worsening cough and is productive. States he does get recurrent episodes similar to this and is prescribed azithromycin by his primary care provider which does help. Is requesting a Z-Nadeem at today's visit. Prescription for Z-Nadeem was given. Patient does take Mobic at home and was instructed to continue this. Also gave tramadol to use every 6 hours if needed for pain not controlled with his home medications. Stable for discharge home. Treatment plan ED return and follow-up instructions discussed prior to discharge. FINAL IMPRESSION      1. Cough    2. Chest pain, unspecified type          DISPOSITION/PLAN   DISPOSITION        PATIENT REFERRED TO:  Axel Rodriguez [address] [place] [id number] [phone]      3 to 5 days if symptoms have not resolved      DISCHARGE MEDICATIONS:  New Prescriptions    AZITHROMYCIN (ZITHROMAX Z-NADEEM) 250 MG TABLET    Take 2 tablets (500 mg) on Day 1, and then take 1 tablet (250 mg) on days 2 through 5. TRAMADOL (ULTRAM) 50 MG TABLET    Take 1 tablet by mouth every 6 hours as needed for Pain for up to 3 days. Intended supply: 3 days. Take lowest dose possible to manage pain     Controlled Substances Monitoring:     No flowsheet data found.     (Please note that portions of this note were completed with a voice recognition program.  Efforts were made to edit the dictations but occasionally words are mis-transcribed.)    Wilner Dowell MD (electronically signed)  Attending Emergency Physician             Wilner Dowell MD  05/28/22 8366

## 2022-05-28 NOTE — ED PROVIDER NOTES
[facility] ED  EMERGENCY DEPARTMENT ENCOUNTER      Pt Name: Ayad Zuñiga  MRN: [id number]  Birthdate 1940  Date of evaluation: 5/28/2022  Provider: Clau Willis MD    CHIEF COMPLAINT       Chief Complaint   Patient presents with    Cough    Pleurisy     Pt having chest pain when breathing, he think he is getting bronchitis, he is coughing         HISTORY OF PRESENT ILLNESS   (Location/Symptom, Timing/Onset, Context/Setting, Quality, Duration, Modifying Factors, Severity)  Note limiting factors. Ayad Zuñiga is a [de-identified] y.o. male who presents to the emergency department for evaluation of cough, chest pain and some shortness of breath. Notes symptoms began yesterday and progressively worsened throughout the day. He notes the chest pain is only present with coughing and is primarily sharp in nature. He also notes some congestion and \"phlegm\" in his chest which he is having difficulty coughing up. Denies any fever. No other complaints. States that he had COVID-19 in January of this year but recovered without incident, though he has had a slight persistent cough. REVIEW OF SYSTEMS    (2-9 systems for level 4, 10 or more for level 5)     Review of Systems   Constitutional: Negative for fever. HENT: Negative for rhinorrhea and sore throat. Respiratory: Positive for cough and shortness of breath. Cardiovascular: Positive for chest pain (with cough). Negative for leg swelling. Gastrointestinal: Negative for abdominal pain, nausea and vomiting. Neurological: Negative. Except as noted above the remainder of the review of systems was reviewed and negative.        PAST MEDICAL HISTORY     Past Medical History:   Diagnosis Date    Arthritis     Cancer [facility])     prostate    Diabetes mellitus ([facility] [address]     Hyperlipidemia     Hypertension     Kidney stone          SURGICAL HISTORY       Past Surgical History:   Procedure Laterality Date    APPENDECTOMY      COLON SURGERY      COLONOSCOPY      HERNIA REPAIR      UPPER GASTROINTESTINAL ENDOSCOPY  10/27/2020    EGD BIOPSY and polypectomy (N/A ) - Dr. Nilda Merida N/A 10/27/2020    EGD BIOPSY and polypectomy performed by Veronica Forman MD at [address]  10/27/2020    EGD DILATION SAVORY performed by Veronica Forman MD at [address], [place]       Previous Medications    ACETAMINOPHEN (TYLENOL) 500 MG TABLET    Take 1,000 mg by mouth daily    AZITHROMYCIN (ZITHROMAX) 250 MG TABLET    Take 250 mg by mouth 2 times daily    LISINOPRIL-HYDROCHLOROTHIAZIDE (PRINZIDE;ZESTORETIC) 20-25 MG PER TABLET    Take 1 tablet by mouth daily    MELOXICAM (MOBIC) 15 MG TABLET    Take 15 mg by mouth daily    METHYLPREDNISOLONE (MEDROL DOSEPACK) 4 MG TABLET    Take 4 mg by mouth 2 times daily    METOPROLOL (LOPRESSOR) 100 MG TABLET    Take 100 mg by mouth 2 times daily    OMEPRAZOLE (PRILOSEC) 20 MG DELAYED RELEASE CAPSULE    Take 20 mg by mouth Daily    ONDANSETRON (ZOFRAN ODT) 4 MG DISINTEGRATING TABLET    Take 1 tablet by mouth every 8 hours as needed for Nausea or Vomiting    SIMVASTATIN (ZOCOR) 40 MG TABLET    Take 40 mg by mouth nightly    SUCRALFATE (CARAFATE) 1 GM/10ML SUSPENSION    Take 10 mLs by mouth 4 times daily May substitute 1 gm Carafate tabs, if pharmacy staff instruct patient how to create slurry at home. ALLERGIES     Pcn [penicillins], Darvon [propoxyphene], Fentanyl, and Morphine    FAMILY HISTORY     No family history on file.        SOCIAL HISTORY       Social History     Socioeconomic History    Marital status:      Spouse name: Not on file    Number of children: Not on file    Years of education: Not on file    Highest education level: Not on file   Occupational History    Not on file   Tobacco Use    Smoking status: Former Smoker     Quit date:      Years since quittin.4    Smokeless tobacco: Never Used   Vaping Use    Vaping Use: Never used   Substance and Sexual Activity    Alcohol use: Never    Drug use: Never    Sexual activity: Not on file   Other Topics Concern    Not on file   Social History Narrative    Not on file     Social Determinants of Health     Financial Resource Strain:     Difficulty of Paying Living Expenses: Not on file   Food Insecurity:     Worried About Running Out of Food in the Last Year: Not on file    Samuel of Food in the Last Year: Not on file   Transportation Needs:     Lack of Transportation (Medical): Not on file    Lack of Transportation (Non-Medical): Not on file   Physical Activity:     Days of Exercise per Week: Not on file    Minutes of Exercise per Session: Not on file   Stress:     Feeling of Stress : Not on file   Social Connections:     Frequency of Communication with Friends and Family: Not on file    Frequency of Social Gatherings with Friends and Family: Not on file    Attends Cheondo[employer] Services: Not on file    Active Member of [address] [place] [address] or Organizations: Not on file    Attends Club or Organization Meetings: Not on file    Marital Status: Not on file   Intimate Partner Violence:     Fear of Current or Ex-Partner: Not on file    Emotionally Abused: Not on file    Physically Abused: Not on file    Sexually Abused: Not on file   Housing Stability:     Unable to Pay for Housing in the Last Year: Not on file    Number of [place] in the Last Year: Not on file    Unstable Housing in the Last Year: Not on file         PHYSICAL EXAM    (up to 7 for level 4, 8 or more for level 5)     ED Triage Vitals [05/28/22 0256]   BP Temp Temp src Heart Rate Resp SpO2 Height Weight   (!) 168/74 -- -- 56 16 95 % 5' 2\" (1.575 m) 176 lb (79.8 kg)       Physical Exam  Vitals reviewed. Constitutional:       General: He is not in acute distress. Appearance: Normal appearance. He is not ill-appearing or diaphoretic. HENT:      Head: Normocephalic and atraumatic.       Nose: Nose normal. No rhinorrhea. Mouth/Throat:      Mouth: Mucous membranes are moist.      Pharynx: Oropharynx is clear. No oropharyngeal exudate. Eyes:      General: No scleral icterus. Right eye: No discharge. Left eye: No discharge. Conjunctiva/sclera: Conjunctivae normal.   Neck:      Vascular: No JVD. Cardiovascular:      Rate and Rhythm: Normal rate and regular rhythm. Heart sounds: No murmur heard. Pulmonary:      Effort: Pulmonary effort is normal. No respiratory distress. Breath sounds: Normal breath sounds. No stridor. No wheezing or rales. Abdominal:      General: There is no distension. Palpations: Abdomen is soft. There is no mass. Tenderness: There is no abdominal tenderness. There is no guarding or rebound. Musculoskeletal:      Cervical back: Neck supple. Comments: No LE swelling or TTP. Skin:     General: Skin is warm and dry. Coloration: Skin is not jaundiced or pale. Neurological:      Mental Status: He is alert. Psychiatric:         Mood and Affect: Mood normal.         Behavior: Behavior normal.         DIAGNOSTIC RESULTS     EKG: All EKG's are interpreted by the Emergency Department Physician who either signs or Co-signs this chart in the absence of a cardiologist.    Sinus bradycardia at 58 bpm.  No STEMI. Voltage criteria for LVH. RADIOLOGY:     Interpretation per the Radiologist below, if available at the time of this note:    XR CHEST PORTABLE   Final Result   Stable appearing chest without acute cardiopulmonary processes.          NM LUNG SCAN PERFUSION ONLY    (Results Pending)         LABS:  Labs Reviewed   CBC WITH AUTO DIFFERENTIAL - Abnormal; Notable for the following components:       Result Value    Hemoglobin 11.6 (*)     Hematocrit 36.2 (*)     Seg Neutrophils 69 (*)     Lymphocytes 16 (*)     Eosinophils % 6 (*)     All other components within normal limits   COMPREHENSIVE METABOLIC PANEL W/ REFLEX TO MG FOR LOW K - Abnormal; Notable for the following components:    Glucose 120 (*)     BUN 30 (*)     CREATININE 2.21 (*)     Calcium 8.2 (*)     Potassium 3.5 (*)     Chloride 108 (*)     Total Bilirubin 0.19 (*)     Total Protein 6.2 (*)     GFR Non- 29 (*)     GFR  35 (*)     All other components within normal limits   TROPONIN - Abnormal; Notable for the following components:    Troponin, High Sensitivity 24 (*)     All other components within normal limits   BRAIN NATRIURETIC PEPTIDE - Abnormal; Notable for the following components:    Pro- (*)     All other components within normal limits   D-DIMER, QUANTITATIVE - Abnormal; Notable for the following components:    D-Dimer, Quant 1.05 (*)     All other components within normal limits   TROPONIN - Abnormal; Notable for the following components:    Troponin, High Sensitivity 23 (*)     All other components within normal limits   COVID-19, RAPID   MAGNESIUM       All other labs were within normal range or not returned as of this dictation. EMERGENCY DEPARTMENT COURSE and DIFFERENTIAL DIAGNOSIS/MDM:   Vitals:    Vitals:    05/28/22 0345 05/28/22 0400 05/28/22 0415 05/28/22 0445   BP:       Pulse: 55 60 55 56   Resp: 14 14 14 16   SpO2: 95% 97% 96% 97%   Weight:       Height:         Patient presents to the ED for evaluation of cough, shortness of breath and chest pain. Hemodynamically stable and generally well-appearing. Physical examination demonstrates no findings of concern. EKG is nonactionable. Troponin x2 are 24, decreased to 23. Given the clinical history as well as the stable troponin I have low suspicion for underlying cardiac issue. Chest x-ray is unremarkable. COVID-19 testing is negative. However, he had a recent COVID-19 infection earlier this year and has a positive D-dimer today. I am concerned about possible PE, though he cannot undergo CT imaging of the chest with IV contrast given his GFR.   Thus, he will undergo V/Q scan later this morning. Care will be transitioned to Dr. Gabriella Phelan, the oncoming ED physician, pending results of VQ scan and reevaluation. REASSESSMENT     ED Course as of 05/28/22 0537   Sat May 28, 2022   0345 Unable to undergo CTPA as he has CKD with Cr of 2.21 today. Ordered V/Q given elevated D-dimer.  [SR]      ED Course User Index  [SR] Angy Hughes MD         FINAL IMPRESSION      1. Cough    2.  Chest pain, unspecified type          DISPOSITION/PLAN   DISPOSITION  - Pending      (Please note that portions of this note were completed with a voice recognition program.  Efforts were made to edit the dictations but occasionally words are mis-transcribed.)    Angy Hughes MD (electronically signed)  Attending Emergency Physician            Angy Hughes MD  05/28/22 6113

## 2024-09-10 ENCOUNTER — APPOINTMENT (OUTPATIENT)
Dept: CT IMAGING | Age: 84
End: 2024-09-10
Payer: OTHER GOVERNMENT

## 2024-09-10 ENCOUNTER — APPOINTMENT (OUTPATIENT)
Dept: GENERAL RADIOLOGY | Age: 84
End: 2024-09-10
Attending: EMERGENCY MEDICINE
Payer: OTHER GOVERNMENT

## 2024-09-10 ENCOUNTER — HOSPITAL ENCOUNTER (EMERGENCY)
Age: 84
Discharge: HOME | End: 2024-09-10
Attending: EMERGENCY MEDICINE
Payer: OTHER GOVERNMENT

## 2024-09-10 VITALS
DIASTOLIC BLOOD PRESSURE: 43 MMHG | HEIGHT: 62 IN | HEART RATE: 56 BPM | BODY MASS INDEX: 34.6 KG/M2 | RESPIRATION RATE: 15 BRPM | OXYGEN SATURATION: 99 % | WEIGHT: 188 LBS | SYSTOLIC BLOOD PRESSURE: 118 MMHG | TEMPERATURE: 98.2 F

## 2024-09-10 DIAGNOSIS — R47.9 TRANSIENT SPEECH DISTURBANCE: Primary | ICD-10-CM

## 2024-09-10 LAB
ANION GAP SERPL CALCULATED.3IONS-SCNC: 11 MMOL/L (ref 9–16)
BASOPHILS # BLD: 0.05 K/UL (ref 0–0.2)
BASOPHILS NFR BLD: 1 % (ref 0–2)
BILIRUB UR QL STRIP: NEGATIVE
BNP SERPL-MCNC: 238 PG/ML (ref 0–450)
BUN SERPL-MCNC: 34 MG/DL (ref 8–23)
BUN/CREAT SERPL: 16 (ref 9–20)
CALCIUM SERPL-MCNC: 9.1 MG/DL (ref 8.6–10.4)
CHLORIDE SERPL-SCNC: 106 MMOL/L (ref 98–107)
CLARITY UR: CLEAR
CO2 SERPL-SCNC: 25 MMOL/L (ref 20–31)
COLOR UR: YELLOW
CREAT SERPL-MCNC: 2.1 MG/DL (ref 0.7–1.2)
EKG ATRIAL RATE: 77 BPM
EKG P AXIS: 55 DEGREES
EKG P-R INTERVAL: 186 MS
EKG Q-T INTERVAL: 386 MS
EKG QRS DURATION: 88 MS
EKG QTC CALCULATION (BAZETT): 436 MS
EKG R AXIS: -16 DEGREES
EKG T AXIS: 4 DEGREES
EKG VENTRICULAR RATE: 77 BPM
EOSINOPHIL # BLD: 0.59 K/UL (ref 0–0.44)
EOSINOPHILS RELATIVE PERCENT: 9 % (ref 1–4)
EPI CELLS #/AREA URNS HPF: NORMAL /HPF (ref 0–5)
ERYTHROCYTE [DISTWIDTH] IN BLOOD BY AUTOMATED COUNT: 13.4 % (ref 11.8–14.4)
GFR, ESTIMATED: 31 ML/MIN/1.73M2
GLUCOSE SERPL-MCNC: 143 MG/DL (ref 74–99)
GLUCOSE UR STRIP-MCNC: NEGATIVE MG/DL
HCT VFR BLD AUTO: 34.3 % (ref 40.7–50.3)
HGB BLD-MCNC: 11.4 G/DL (ref 13–17)
HGB UR QL STRIP.AUTO: NEGATIVE
IMM GRANULOCYTES # BLD AUTO: <0.03 K/UL (ref 0–0.3)
IMM GRANULOCYTES NFR BLD: 0 %
KETONES UR STRIP-MCNC: NEGATIVE MG/DL
LEUKOCYTE ESTERASE UR QL STRIP: NEGATIVE
LYMPHOCYTES NFR BLD: 1.06 K/UL (ref 1.1–3.7)
LYMPHOCYTES RELATIVE PERCENT: 17 % (ref 24–43)
MCH RBC QN AUTO: 28.5 PG (ref 25.2–33.5)
MCHC RBC AUTO-ENTMCNC: 33.2 G/DL (ref 28.4–34.8)
MCV RBC AUTO: 85.8 FL (ref 82.6–102.9)
MONOCYTES NFR BLD: 0.47 K/UL (ref 0.1–1.2)
MONOCYTES NFR BLD: 7 % (ref 3–12)
NEUTROPHILS NFR BLD: 66 % (ref 36–65)
NEUTS SEG NFR BLD: 4.2 K/UL (ref 1.5–8.1)
NITRITE UR QL STRIP: NEGATIVE
NRBC BLD-RTO: 0 PER 100 WBC
PH UR STRIP: 6 [PH] (ref 5–9)
PLATELET # BLD AUTO: 264 K/UL (ref 138–453)
PMV BLD AUTO: 9.6 FL (ref 8.1–13.5)
POTASSIUM SERPL-SCNC: 3.8 MMOL/L (ref 3.7–5.3)
PROT UR STRIP-MCNC: NEGATIVE MG/DL
RBC # BLD AUTO: 4 M/UL (ref 4.21–5.77)
RBC #/AREA URNS HPF: NORMAL /HPF (ref 0–2)
SODIUM SERPL-SCNC: 142 MMOL/L (ref 136–145)
SP GR UR STRIP: 1.02 (ref 1.01–1.02)
TROPONIN I SERPL HS-MCNC: 30 NG/L (ref 0–22)
TROPONIN I SERPL HS-MCNC: 31 NG/L (ref 0–22)
UROBILINOGEN UR STRIP-ACNC: NORMAL EU/DL (ref 0–1)
WBC #/AREA URNS HPF: NORMAL /HPF (ref 0–5)
WBC OTHER # BLD: 6.4 K/UL (ref 3.5–11.3)

## 2024-09-10 PROCEDURE — 93010 ELECTROCARDIOGRAM REPORT: CPT | Performed by: INTERNAL MEDICINE

## 2024-09-10 PROCEDURE — 71045 X-RAY EXAM CHEST 1 VIEW: CPT

## 2024-09-10 PROCEDURE — 85025 COMPLETE CBC W/AUTO DIFF WBC: CPT

## 2024-09-10 PROCEDURE — 99285 EMERGENCY DEPT VISIT HI MDM: CPT

## 2024-09-10 PROCEDURE — 84484 ASSAY OF TROPONIN QUANT: CPT

## 2024-09-10 PROCEDURE — 80048 BASIC METABOLIC PNL TOTAL CA: CPT

## 2024-09-10 PROCEDURE — 93005 ELECTROCARDIOGRAM TRACING: CPT | Performed by: EMERGENCY MEDICINE

## 2024-09-10 PROCEDURE — 36415 COLL VENOUS BLD VENIPUNCTURE: CPT

## 2024-09-10 PROCEDURE — 81001 URINALYSIS AUTO W/SCOPE: CPT

## 2024-09-10 PROCEDURE — 83880 ASSAY OF NATRIURETIC PEPTIDE: CPT

## 2024-09-10 PROCEDURE — 70450 CT HEAD/BRAIN W/O DYE: CPT

## 2024-09-10 RX ORDER — SELENIUM 50 MCG
2 TABLET ORAL DAILY
COMMUNITY

## 2024-09-10 RX ORDER — CLOPIDOGREL BISULFATE 75 MG/1
75 TABLET ORAL DAILY
COMMUNITY
Start: 2024-09-04

## 2024-09-10 RX ORDER — LISINOPRIL AND HYDROCHLOROTHIAZIDE 20; 25 MG/1; MG/1
1 TABLET ORAL DAILY
COMMUNITY

## 2024-09-10 RX ORDER — ATORVASTATIN CALCIUM 40 MG/1
40 TABLET, FILM COATED ORAL DAILY
COMMUNITY
Start: 2024-09-04

## 2024-09-10 RX ORDER — ASPIRIN 81 MG/1
81 TABLET ORAL DAILY
Qty: 14 TABLET | Refills: 0 | Status: SHIPPED | OUTPATIENT
Start: 2024-09-10 | End: 2024-09-24

## 2024-09-10 ASSESSMENT — PAIN - FUNCTIONAL ASSESSMENT
PAIN_FUNCTIONAL_ASSESSMENT: NONE - DENIES PAIN
PAIN_FUNCTIONAL_ASSESSMENT: NONE - DENIES PAIN

## 2024-09-10 ASSESSMENT — LIFESTYLE VARIABLES
HOW OFTEN DO YOU HAVE A DRINK CONTAINING ALCOHOL: NEVER
HOW MANY STANDARD DRINKS CONTAINING ALCOHOL DO YOU HAVE ON A TYPICAL DAY: PATIENT DOES NOT DRINK

## 2024-10-09 ENCOUNTER — HOSPITAL ENCOUNTER (EMERGENCY)
Age: 84
Discharge: HOME | End: 2024-10-09
Attending: EMERGENCY MEDICINE
Payer: OTHER GOVERNMENT

## 2024-10-09 ENCOUNTER — APPOINTMENT (OUTPATIENT)
Dept: GENERAL RADIOLOGY | Age: 84
End: 2024-10-09
Payer: OTHER GOVERNMENT

## 2024-10-09 VITALS
SYSTOLIC BLOOD PRESSURE: 149 MMHG | DIASTOLIC BLOOD PRESSURE: 75 MMHG | RESPIRATION RATE: 16 BRPM | HEART RATE: 53 BPM | TEMPERATURE: 97.4 F | BODY MASS INDEX: 34.2 KG/M2 | OXYGEN SATURATION: 97 % | WEIGHT: 187 LBS

## 2024-10-09 DIAGNOSIS — G89.29 CHRONIC PAIN OF BOTH FEET: Primary | ICD-10-CM

## 2024-10-09 DIAGNOSIS — M79.671 CHRONIC PAIN OF BOTH FEET: Primary | ICD-10-CM

## 2024-10-09 DIAGNOSIS — M19.071 ARTHRITIS OF BOTH FEET: ICD-10-CM

## 2024-10-09 DIAGNOSIS — M79.672 CHRONIC PAIN OF BOTH FEET: Primary | ICD-10-CM

## 2024-10-09 DIAGNOSIS — M19.072 ARTHRITIS OF BOTH FEET: ICD-10-CM

## 2024-10-09 PROCEDURE — 73630 X-RAY EXAM OF FOOT: CPT

## 2024-10-09 PROCEDURE — 99283 EMERGENCY DEPT VISIT LOW MDM: CPT

## 2024-10-09 RX ORDER — ASPIRIN 81 MG/1
81 TABLET ORAL DAILY
COMMUNITY

## 2024-10-09 ASSESSMENT — LIFESTYLE VARIABLES
HOW MANY STANDARD DRINKS CONTAINING ALCOHOL DO YOU HAVE ON A TYPICAL DAY: PATIENT DOES NOT DRINK
HOW OFTEN DO YOU HAVE A DRINK CONTAINING ALCOHOL: NEVER

## 2024-10-09 ASSESSMENT — PAIN SCALES - GENERAL: PAINLEVEL_OUTOF10: 2

## 2024-10-09 ASSESSMENT — PAIN - FUNCTIONAL ASSESSMENT: PAIN_FUNCTIONAL_ASSESSMENT: 0-10

## 2024-10-09 ASSESSMENT — PAIN DESCRIPTION - ORIENTATION: ORIENTATION: RIGHT;LEFT

## 2024-10-09 ASSESSMENT — PAIN DESCRIPTION - LOCATION: LOCATION: FOOT

## 2024-12-20 ENCOUNTER — HOSPITAL ENCOUNTER (EMERGENCY)
Age: 84
Discharge: HOME | End: 2024-12-20
Attending: EMERGENCY MEDICINE
Payer: OTHER GOVERNMENT

## 2024-12-20 ENCOUNTER — APPOINTMENT (OUTPATIENT)
Dept: GENERAL RADIOLOGY | Age: 84
End: 2024-12-20
Payer: OTHER GOVERNMENT

## 2024-12-20 VITALS
DIASTOLIC BLOOD PRESSURE: 61 MMHG | HEART RATE: 62 BPM | RESPIRATION RATE: 14 BRPM | SYSTOLIC BLOOD PRESSURE: 135 MMHG | TEMPERATURE: 98.6 F | OXYGEN SATURATION: 95 %

## 2024-12-20 DIAGNOSIS — I95.1 ORTHOSTATIC HYPOTENSION: ICD-10-CM

## 2024-12-20 DIAGNOSIS — E86.0 DEHYDRATION: ICD-10-CM

## 2024-12-20 DIAGNOSIS — J06.9 UPPER RESPIRATORY TRACT INFECTION, UNSPECIFIED TYPE: Primary | ICD-10-CM

## 2024-12-20 LAB
ALBUMIN SERPL-MCNC: 3.8 G/DL (ref 3.5–5.2)
ALBUMIN/GLOB SERPL: 1.4 {RATIO} (ref 1–2.5)
ALP SERPL-CCNC: 105 U/L (ref 40–129)
ALT SERPL-CCNC: 17 U/L (ref 10–50)
ANION GAP SERPL CALCULATED.3IONS-SCNC: 11 MMOL/L (ref 9–16)
AST SERPL-CCNC: 16 U/L (ref 10–50)
BASOPHILS # BLD: 0 K/UL (ref 0–0.2)
BASOPHILS NFR BLD: 0 % (ref 0–2)
BILIRUB SERPL-MCNC: 0.5 MG/DL (ref 0–1.2)
BILIRUB UR QL STRIP: NEGATIVE
BUN SERPL-MCNC: 28 MG/DL (ref 8–23)
BUN/CREAT SERPL: 14 (ref 9–20)
CALCIUM SERPL-MCNC: 8.2 MG/DL (ref 8.6–10.4)
CHLORIDE SERPL-SCNC: 104 MMOL/L (ref 98–107)
CLARITY UR: CLEAR
CO2 SERPL-SCNC: 25 MMOL/L (ref 20–31)
COLOR UR: YELLOW
CREAT SERPL-MCNC: 2 MG/DL (ref 0.7–1.2)
EOSINOPHIL # BLD: 0 K/UL (ref 0–0.44)
EOSINOPHILS RELATIVE PERCENT: 0 % (ref 1–4)
ERYTHROCYTE [DISTWIDTH] IN BLOOD BY AUTOMATED COUNT: 13.2 % (ref 11.8–14.4)
FLUAV AG SPEC QL: NEGATIVE
FLUBV AG SPEC QL: NEGATIVE
GFR, ESTIMATED: 33 ML/MIN/1.73M2
GLUCOSE SERPL-MCNC: 131 MG/DL (ref 74–99)
GLUCOSE UR STRIP-MCNC: NEGATIVE MG/DL
HCT VFR BLD AUTO: 36.9 % (ref 40.7–50.3)
HGB BLD-MCNC: 12 G/DL (ref 13–17)
HGB UR QL STRIP.AUTO: NEGATIVE
IMM GRANULOCYTES # BLD AUTO: 0 K/UL (ref 0–0.3)
IMM GRANULOCYTES NFR BLD: 0 %
KETONES UR STRIP-MCNC: NEGATIVE MG/DL
LACTATE BLDV-SCNC: 0.8 MMOL/L (ref 0.5–1.9)
LACTATE BLDV-SCNC: 1.3 MMOL/L (ref 0.5–1.9)
LEUKOCYTE ESTERASE UR QL STRIP: NEGATIVE
LIPASE SERPL-CCNC: 48 U/L (ref 13–60)
LYMPHOCYTES NFR BLD: 0.11 K/UL (ref 1.1–3.7)
LYMPHOCYTES RELATIVE PERCENT: 1 % (ref 24–43)
MCH RBC QN AUTO: 28 PG (ref 25.2–33.5)
MCHC RBC AUTO-ENTMCNC: 32.5 G/DL (ref 28.4–34.8)
MCV RBC AUTO: 86 FL (ref 82.6–102.9)
MONOCYTES NFR BLD: 0.89 K/UL (ref 0.1–1.2)
MONOCYTES NFR BLD: 8 % (ref 3–12)
MORPHOLOGY: NORMAL
NEUTROPHILS NFR BLD: 91 % (ref 36–65)
NEUTS SEG NFR BLD: 10.1 K/UL (ref 1.5–8.1)
NITRITE UR QL STRIP: NEGATIVE
NRBC BLD-RTO: 0 PER 100 WBC
PH UR STRIP: 6 [PH] (ref 5–9)
PLATELET # BLD AUTO: 287 K/UL (ref 138–453)
PMV BLD AUTO: 9.7 FL (ref 8.1–13.5)
POTASSIUM SERPL-SCNC: 3.5 MMOL/L (ref 3.7–5.3)
PROT SERPL-MCNC: 6.5 G/DL (ref 6.6–8.7)
PROT UR STRIP-MCNC: NEGATIVE MG/DL
RBC # BLD AUTO: 4.29 M/UL (ref 4.21–5.77)
SARS-COV-2 RDRP RESP QL NAA+PROBE: NOT DETECTED
SODIUM SERPL-SCNC: 140 MMOL/L (ref 136–145)
SP GR UR STRIP: 1.02 (ref 1.01–1.02)
SPECIMEN DESCRIPTION: NORMAL
TROPONIN I SERPL HS-MCNC: 23 NG/L (ref 0–22)
UROBILINOGEN UR STRIP-ACNC: NORMAL EU/DL (ref 0–1)
WBC OTHER # BLD: 11.1 K/UL (ref 3.5–11.3)

## 2024-12-20 PROCEDURE — 80053 COMPREHEN METABOLIC PANEL: CPT

## 2024-12-20 PROCEDURE — 93005 ELECTROCARDIOGRAM TRACING: CPT | Performed by: EMERGENCY MEDICINE

## 2024-12-20 PROCEDURE — 2580000003 HC RX 258: Performed by: EMERGENCY MEDICINE

## 2024-12-20 PROCEDURE — 71045 X-RAY EXAM CHEST 1 VIEW: CPT

## 2024-12-20 PROCEDURE — 87635 SARS-COV-2 COVID-19 AMP PRB: CPT

## 2024-12-20 PROCEDURE — 87040 BLOOD CULTURE FOR BACTERIA: CPT

## 2024-12-20 PROCEDURE — 83690 ASSAY OF LIPASE: CPT

## 2024-12-20 PROCEDURE — 81003 URINALYSIS AUTO W/O SCOPE: CPT

## 2024-12-20 PROCEDURE — 84484 ASSAY OF TROPONIN QUANT: CPT

## 2024-12-20 PROCEDURE — 96360 HYDRATION IV INFUSION INIT: CPT

## 2024-12-20 PROCEDURE — 83605 ASSAY OF LACTIC ACID: CPT

## 2024-12-20 PROCEDURE — 85025 COMPLETE CBC W/AUTO DIFF WBC: CPT

## 2024-12-20 PROCEDURE — 87804 INFLUENZA ASSAY W/OPTIC: CPT

## 2024-12-20 PROCEDURE — 99285 EMERGENCY DEPT VISIT HI MDM: CPT

## 2024-12-20 RX ORDER — 0.9 % SODIUM CHLORIDE 0.9 %
500 INTRAVENOUS SOLUTION INTRAVENOUS ONCE
Status: COMPLETED | OUTPATIENT
Start: 2024-12-20 | End: 2024-12-20

## 2024-12-20 RX ORDER — AZITHROMYCIN 250 MG/1
TABLET, FILM COATED ORAL
Qty: 6 TABLET | Refills: 0 | Status: SHIPPED | OUTPATIENT
Start: 2024-12-20 | End: 2024-12-30

## 2024-12-20 RX ADMIN — SODIUM CHLORIDE 500 ML: 9 INJECTION, SOLUTION INTRAVENOUS at 12:08

## 2024-12-20 ASSESSMENT — PAIN - FUNCTIONAL ASSESSMENT: PAIN_FUNCTIONAL_ASSESSMENT: NONE - DENIES PAIN

## 2024-12-20 NOTE — ED NOTES
Pt ambulated at cart side at this time. Pt states slightly dizzy, but feels almost back to baseline.

## 2024-12-20 NOTE — ED PROVIDER NOTES
EMERGENCY DEPARTMENT ENCOUNTER    Pt Name: Lloyd Greenberg  MRN: [id number]  Birthdate 1940  Date of evaluation: 12/20/24  CHIEF COMPLAINT       Chief Complaint   Patient presents with    Hypotension     Patient was at the doctor office, patient states dizziness and lightheadedness while at PCP.     HISTORY OF PRESENT ILLNESS   This is an 84-year-old male that presents emergency department with complaints of some dizziness, nausea and not feeling well.  The patient states that he was at the doctor's office today for regular checkup, he began having an episode of dizziness, nausea and not feeling well.  He was noted to have hypotension and brought here for further evaluation.  The patient states that he had a mild cough, he denies any chest pain, he has no abdominal pain nausea or vomiting.           REVIEW OF SYSTEMS     Review of Systems  PASTMEDICAL HISTORY     Past Medical History:   Diagnosis Date    Arthritis     Cancer (HCC)     prostate    Diabetes mellitus (HCC)     Hyperlipidemia     Hypertension     Kidney stone      Past Problem List  Patient Active Problem List   Diagnosis Code    Dysphagia R13.10    COVID-19 U07.1     SURGICAL HISTORY       Past Surgical History:   Procedure Laterality Date    APPENDECTOMY      COLON SURGERY      COLONOSCOPY      HERNIA REPAIR      UPPER GASTROINTESTINAL ENDOSCOPY  10/27/2020    EGD BIOPSY and polypectomy (N/A ) - Dr. Valdez     UPPER GASTROINTESTINAL ENDOSCOPY N/A 10/27/2020    EGD BIOPSY and polypectomy performed by Jayant Valdez MD at [facility] OR    UPPER GASTROINTESTINAL ENDOSCOPY  10/27/2020    EGD DILATION SAVORY performed by Jayant Valdez MD at [facility] OR     CURRENT MEDICATIONS       Previous Medications    ASPIRIN 81 MG EC TABLET    Take 1 tablet by mouth daily    ATORVASTATIN (LIPITOR) 40 MG TABLET    Take 1 tablet by mouth daily    CLOPIDOGREL (PLAVIX) 75 MG TABLET    Take 1 tablet by mouth daily    LACTOBACILLUS (ACIDOPHILUS) CAPS CAPSULE    Take 2 capsules

## 2024-12-21 LAB
EKG ATRIAL RATE: 56 BPM
EKG P AXIS: 34 DEGREES
EKG P-R INTERVAL: 182 MS
EKG Q-T INTERVAL: 448 MS
EKG QRS DURATION: 94 MS
EKG QTC CALCULATION (BAZETT): 432 MS
EKG R AXIS: -21 DEGREES
EKG T AXIS: 21 DEGREES
EKG VENTRICULAR RATE: 56 BPM

## 2024-12-22 LAB
MICROORGANISM SPEC CULT: NORMAL
MICROORGANISM SPEC CULT: NORMAL
SERVICE CMNT-IMP: NORMAL
SERVICE CMNT-IMP: NORMAL
SPECIMEN DESCRIPTION: NORMAL
SPECIMEN DESCRIPTION: NORMAL

## 2025-01-16 ENCOUNTER — OFFICE VISIT (OUTPATIENT)
Dept: NEUROLOGY | Age: 85
End: 2025-01-16
Payer: OTHER GOVERNMENT

## 2025-01-16 VITALS
RESPIRATION RATE: 16 BRPM | HEIGHT: 62 IN | DIASTOLIC BLOOD PRESSURE: 70 MMHG | TEMPERATURE: 96.8 F | HEART RATE: 55 BPM | SYSTOLIC BLOOD PRESSURE: 131 MMHG | WEIGHT: 178 LBS | BODY MASS INDEX: 32.76 KG/M2

## 2025-01-16 DIAGNOSIS — R47.01 APHASIA: Primary | ICD-10-CM

## 2025-01-16 PROCEDURE — 1123F ACP DISCUSS/DSCN MKR DOCD: CPT | Performed by: NEUROMUSCULOSKELETAL MEDICINE, SPORTS MEDICINE

## 2025-01-16 PROCEDURE — 99204 OFFICE O/P NEW MOD 45 MIN: CPT | Performed by: NEUROMUSCULOSKELETAL MEDICINE, SPORTS MEDICINE

## 2025-01-16 RX ORDER — SIMVASTATIN 40 MG
40 TABLET ORAL NIGHTLY
COMMUNITY

## 2025-01-16 RX ORDER — LISINOPRIL 20 MG/1
20 TABLET ORAL DAILY
COMMUNITY

## 2025-01-16 RX ORDER — ACETAMINOPHEN 500 MG
500 TABLET ORAL EVERY 6 HOURS PRN
COMMUNITY

## 2025-01-16 NOTE — PATIENT INSTRUCTIONS
SURVEY:    Thank you for allowing us to care for you today.    You may be receiving a survey from [facility] regarding your visit today- electronically or via mail.      Please help us by completing the survey as this will provide the needed feedback to ensure we are providing the very best care for you and your family.    If you cannot score us a very good on any question, please call the office to discuss how we could have made your experience a very good one.    Thank you.       STAFF:    Maggy Lopez, Haley MENDES      CLINICAL STAFF:    Fabby SOTO, Sigrid MENDES, Alessia MENDES, Lisa SOTO

## 2025-01-16 NOTE — PROGRESS NOTES
NEUROLOGY CONSULT    Patient Name:  Lloyd Greenberg  :   1940  Clinic Visit Date: 2025    I saw Mr. Lloyd Greenberg  in the neurology clinic today for a consultation regarding history of recurrent TIAs or strokes.  84-year-old right-handed gentleman with hypertension, hyperlipidemia, chronic hearing loss, presents with complaints of having had recurrent episodes of difficulty with speaking and expressing himself in 2024.  According to him and his wife the episodes were to occur sporadically and frequently.  He would have difficulty with finding words and making sentences clearly.  The episodes would last a few seconds or minutes.  He had recurrent episodes in the months of , November.  He was admitted at the [facility] for about 4 days and was diagnosed as having possible minor strokelike episodes or TIAs.  He had been discharged on a combination of aspirin and Plavix. Workup was unremarkable.  On  he had a CT scan of the brain at [facility] emergency room for lightheadedness and this demonstrated the presence of multiple small lacunar infarctions in the basal ganglia bilaterally.  He has been doing generally well since about a month without any recurrent neurological symptoms of concern.  Specifically no headaches, blurred vision double vision slurred speech facial numbness, or weakness/numbness in extremities or altered sensorium.    REVIEW OF SYSTEMS    Constitutional Weight changes: absent, change in appetite: absent Fatigue: absent;Fevers : absent, Any recent hospitalizations:  absent   HEENT Ears: normal,  Visual disturbance: absent   Respiratory Shortness of breath: absent, choking:  absent, Cough: absent, Snoring : absent   Cardiovascular Chest pain: absent, Leg swelling :absent, palpitations : absent, fainting : absent   GI Constipation: absent, Diarrhea: absent, Swallowing change: absent    Urinary frequency: absent, Urinary urgency:

## 2025-05-26 ENCOUNTER — APPOINTMENT (OUTPATIENT)
Dept: GENERAL RADIOLOGY | Age: 85
End: 2025-05-26
Attending: EMERGENCY MEDICINE
Payer: OTHER GOVERNMENT

## 2025-05-26 ENCOUNTER — HOSPITAL ENCOUNTER (EMERGENCY)
Age: 85
Discharge: HOME | End: 2025-05-26
Attending: EMERGENCY MEDICINE
Payer: OTHER GOVERNMENT

## 2025-05-26 VITALS
SYSTOLIC BLOOD PRESSURE: 132 MMHG | RESPIRATION RATE: 16 BRPM | DIASTOLIC BLOOD PRESSURE: 85 MMHG | HEART RATE: 85 BPM | OXYGEN SATURATION: 98 % | TEMPERATURE: 98 F

## 2025-05-26 DIAGNOSIS — S61.431A PUNCTURE WOUND OF RIGHT HAND WITHOUT FOREIGN BODY, INITIAL ENCOUNTER: ICD-10-CM

## 2025-05-26 DIAGNOSIS — M79.641 RIGHT HAND PAIN: Primary | ICD-10-CM

## 2025-05-26 PROCEDURE — 6370000000 HC RX 637 (ALT 250 FOR IP): Performed by: EMERGENCY MEDICINE

## 2025-05-26 PROCEDURE — 90715 TDAP VACCINE 7 YRS/> IM: CPT | Performed by: EMERGENCY MEDICINE

## 2025-05-26 PROCEDURE — 73130 X-RAY EXAM OF HAND: CPT

## 2025-05-26 PROCEDURE — 73110 X-RAY EXAM OF WRIST: CPT

## 2025-05-26 PROCEDURE — 99284 EMERGENCY DEPT VISIT MOD MDM: CPT

## 2025-05-26 PROCEDURE — 90471 IMMUNIZATION ADMIN: CPT | Performed by: EMERGENCY MEDICINE

## 2025-05-26 PROCEDURE — 6360000002 HC RX W HCPCS: Performed by: EMERGENCY MEDICINE

## 2025-05-26 RX ORDER — SULFAMETHOXAZOLE AND TRIMETHOPRIM 800; 160 MG/1; MG/1
1 TABLET ORAL ONCE
Status: COMPLETED | OUTPATIENT
Start: 2025-05-26 | End: 2025-05-26

## 2025-05-26 RX ORDER — HYDROCODONE BITARTRATE AND ACETAMINOPHEN 5; 325 MG/1; MG/1
1 TABLET ORAL ONCE
Status: COMPLETED | OUTPATIENT
Start: 2025-05-26 | End: 2025-05-26

## 2025-05-26 RX ORDER — SULFAMETHOXAZOLE AND TRIMETHOPRIM 800; 160 MG/1; MG/1
1 TABLET ORAL 2 TIMES DAILY
Qty: 14 TABLET | Refills: 0 | Status: ON HOLD | OUTPATIENT
Start: 2025-05-26 | End: 2025-06-02

## 2025-05-26 RX ORDER — BACITRACIN ZINC 500 [USP'U]/G
OINTMENT TOPICAL 2 TIMES DAILY
Status: DISCONTINUED | OUTPATIENT
Start: 2025-05-26 | End: 2025-05-27 | Stop reason: HOSPADM

## 2025-05-26 RX ADMIN — TETANUS TOXOID, REDUCED DIPHTHERIA TOXOID AND ACELLULAR PERTUSSIS VACCINE, ADSORBED 0.5 ML: 5; 2.5; 8; 8; 2.5 SUSPENSION INTRAMUSCULAR at 22:26

## 2025-05-26 RX ADMIN — BACITRACIN ZINC: 500 OINTMENT TOPICAL at 22:17

## 2025-05-26 RX ADMIN — SULFAMETHOXAZOLE AND TRIMETHOPRIM 1 TABLET: 800; 160 TABLET ORAL at 22:27

## 2025-05-26 RX ADMIN — HYDROCODONE BITARTRATE AND ACETAMINOPHEN 1 TABLET: 5; 325 TABLET ORAL at 22:18

## 2025-05-26 ASSESSMENT — PAIN DESCRIPTION - ORIENTATION: ORIENTATION: RIGHT

## 2025-05-26 ASSESSMENT — PAIN DESCRIPTION - LOCATION: LOCATION: WRIST

## 2025-05-26 ASSESSMENT — PAIN SCALES - GENERAL: PAINLEVEL_OUTOF10: 8

## 2025-05-26 ASSESSMENT — PAIN DESCRIPTION - DESCRIPTORS: DESCRIPTORS: SHOOTING

## 2025-05-27 NOTE — DISCHARGE INSTRUCTIONS
Keep wound clean with soap and water.  Apply small amount of antibiotic ointment and cover with loose sterile dressing.  Wear wrist splint during the day as needed for comfort.  Take Bactrim as directed until complete.  Follow-up with your primary care provider tomorrow as scheduled.  Please return immediately should you develop any worsening symptoms or any other acute concerns

## 2025-05-27 NOTE — ED PROVIDER NOTES
[facility] EMERGENCY DEPARTMENT  EMERGENCY DEPARTMENT ENCOUNTER      Pt Name: Lloyd Greenberg  MRN: [id number]  Birthdate 1940  Date of evaluation: 5/26/2025  Provider: Amanda Jung MD    CHIEF COMPLAINT       Chief Complaint   Patient presents with    Wrist Injury     Patient with pain and swelling to right wrist after pulling a garden hose         HISTORY OF PRESENT ILLNESS   (Location/Symptom, Timing/Onset, Context/Setting, Quality, Duration, Modifying Factors, Severity)  Note limiting factors.   Lloyd Greenberg is a 84 y.o. male who presents to the emergency department      84-year-old male presented the emergency department after being attacked by his rooster.  Has a puncture wound to the back of his right hand.  This happened around 2 2:30 PM today.  He had also been using his garden hose and thinks this may have triggered some of the pain.  He did not initially have any pain but this evening he developed pain and swelling in his right hand.  Tetanus is not up-to-date.  No other injuries or any other acute concerns.        Nursing Notes were reviewed.    REVIEW OF SYSTEMS    (2-9 systems for level 4, 10 or more for level 5)     Review of Systems   All other systems reviewed and are negative.      Except as noted above the remainder of the review of systems was reviewed and negative.       PAST MEDICAL HISTORY     Past Medical History:   Diagnosis Date    Arthritis     Diabetes mellitus (HCC)     History of prostate cancer     Hyperlipidemia     Hypertension     Kidney stone     TIA (transient ischemic attack)     Multiple         SURGICAL HISTORY       Past Surgical History:   Procedure Laterality Date    ABDOMEN SURGERY      Bowel obstruction    APPENDECTOMY      COLON SURGERY      COLONOSCOPY      CYSTOSCOPY      x4    HERNIA REPAIR      KIDNEY STONE REMOVAL      UPPER GASTROINTESTINAL ENDOSCOPY  10/27/2020    EGD BIOPSY and polypectomy (N/A ) - Dr. Valdez     UPPER GASTROINTESTINAL ENDOSCOPY N/A

## 2025-05-30 ENCOUNTER — HOSPITAL ENCOUNTER (INPATIENT)
Age: 85
LOS: 3 days | Discharge: HOME | DRG: 580 | End: 2025-06-02
Attending: EMERGENCY MEDICINE | Admitting: INTERNAL MEDICINE
Payer: OTHER GOVERNMENT

## 2025-05-30 ENCOUNTER — APPOINTMENT (OUTPATIENT)
Dept: GENERAL RADIOLOGY | Age: 85
DRG: 580 | End: 2025-05-30
Payer: OTHER GOVERNMENT

## 2025-05-30 DIAGNOSIS — L03.113 CELLULITIS OF HAND, RIGHT: Primary | ICD-10-CM

## 2025-05-30 PROBLEM — U07.1 COVID-19: Status: RESOLVED | Noted: 2022-01-09 | Resolved: 2025-05-30

## 2025-05-30 PROBLEM — L03.119 CELLULITIS AND ABSCESS OF HAND: Status: ACTIVE | Noted: 2025-05-30

## 2025-05-30 PROBLEM — L02.519 CELLULITIS AND ABSCESS OF HAND: Status: ACTIVE | Noted: 2025-05-30

## 2025-05-30 LAB
ANION GAP SERPL CALCULATED.3IONS-SCNC: 12 MMOL/L (ref 9–16)
BASOPHILS # BLD: 0.04 K/UL (ref 0–0.2)
BASOPHILS NFR BLD: 1 % (ref 0–2)
BUN SERPL-MCNC: 25 MG/DL (ref 8–23)
BUN/CREAT SERPL: 13 (ref 9–20)
CALCIUM SERPL-MCNC: 8.5 MG/DL (ref 8.6–10.4)
CHLORIDE SERPL-SCNC: 105 MMOL/L (ref 98–107)
CO2 SERPL-SCNC: 22 MMOL/L (ref 20–31)
CREAT SERPL-MCNC: 2 MG/DL (ref 0.7–1.2)
CRP SERPL HS-MCNC: 82.6 MG/L (ref 0–5)
EOSINOPHIL # BLD: 0.27 K/UL (ref 0–0.44)
EOSINOPHILS RELATIVE PERCENT: 3 % (ref 1–4)
ERYTHROCYTE [DISTWIDTH] IN BLOOD BY AUTOMATED COUNT: 12.9 % (ref 11.8–14.4)
ERYTHROCYTE [SEDIMENTATION RATE] IN BLOOD BY PHOTOMETRIC METHOD: 50 MM/HR (ref 0–20)
GFR, ESTIMATED: 33 ML/MIN/1.73M2
GLUCOSE SERPL-MCNC: 105 MG/DL (ref 74–99)
HCT VFR BLD AUTO: 34.1 % (ref 40.7–50.3)
HGB BLD-MCNC: 11 G/DL (ref 13–17)
IMM GRANULOCYTES # BLD AUTO: <0.03 K/UL (ref 0–0.3)
IMM GRANULOCYTES NFR BLD: 0 %
LYMPHOCYTES NFR BLD: 0.8 K/UL (ref 1.1–3.7)
LYMPHOCYTES RELATIVE PERCENT: 10 % (ref 24–43)
MCH RBC QN AUTO: 27.9 PG (ref 25.2–33.5)
MCHC RBC AUTO-ENTMCNC: 32.3 G/DL (ref 28.4–34.8)
MCV RBC AUTO: 86.5 FL (ref 82.6–102.9)
MONOCYTES NFR BLD: 0.64 K/UL (ref 0.1–1.2)
MONOCYTES NFR BLD: 8 % (ref 3–12)
NEUTROPHILS NFR BLD: 78 % (ref 36–65)
NEUTS SEG NFR BLD: 6.38 K/UL (ref 1.5–8.1)
NRBC BLD-RTO: 0 PER 100 WBC
PLATELET # BLD AUTO: 304 K/UL (ref 138–453)
PMV BLD AUTO: 9.2 FL (ref 8.1–13.5)
POTASSIUM SERPL-SCNC: 3.4 MMOL/L (ref 3.7–5.3)
RBC # BLD AUTO: 3.94 M/UL (ref 4.21–5.77)
SODIUM SERPL-SCNC: 139 MMOL/L (ref 136–145)
WBC OTHER # BLD: 8.2 K/UL (ref 3.5–11.3)

## 2025-05-30 PROCEDURE — 87205 SMEAR GRAM STAIN: CPT

## 2025-05-30 PROCEDURE — 0JBJ0ZZ EXCISION OF RIGHT HAND SUBCUTANEOUS TISSUE AND FASCIA, OPEN APPROACH: ICD-10-PCS | Performed by: ORTHOPAEDIC SURGERY

## 2025-05-30 PROCEDURE — 86140 C-REACTIVE PROTEIN: CPT

## 2025-05-30 PROCEDURE — 87185 SC STD ENZYME DETCJ PER NZM: CPT

## 2025-05-30 PROCEDURE — 6360000002 HC RX W HCPCS: Performed by: EMERGENCY MEDICINE

## 2025-05-30 PROCEDURE — 0RBN0ZZ EXCISION OF RIGHT WRIST JOINT, OPEN APPROACH: ICD-10-PCS | Performed by: ORTHOPAEDIC SURGERY

## 2025-05-30 PROCEDURE — 80048 BASIC METABOLIC PNL TOTAL CA: CPT

## 2025-05-30 PROCEDURE — 87077 CULTURE AEROBIC IDENTIFY: CPT

## 2025-05-30 PROCEDURE — 85652 RBC SED RATE AUTOMATED: CPT

## 2025-05-30 PROCEDURE — 2580000003 HC RX 258: Performed by: EMERGENCY MEDICINE

## 2025-05-30 PROCEDURE — 87076 CULTURE ANAEROBE IDENT EACH: CPT

## 2025-05-30 PROCEDURE — 87070 CULTURE OTHR SPECIMN AEROBIC: CPT

## 2025-05-30 PROCEDURE — 85025 COMPLETE CBC W/AUTO DIFF WBC: CPT

## 2025-05-30 PROCEDURE — 99285 EMERGENCY DEPT VISIT HI MDM: CPT

## 2025-05-30 PROCEDURE — 87186 SC STD MICRODIL/AGAR DIL: CPT

## 2025-05-30 PROCEDURE — 96365 THER/PROPH/DIAG IV INF INIT: CPT

## 2025-05-30 PROCEDURE — 94761 N-INVAS EAR/PLS OXIMETRY MLT: CPT

## 2025-05-30 PROCEDURE — 0LB70ZZ EXCISION OF RIGHT HAND TENDON, OPEN APPROACH: ICD-10-PCS | Performed by: ORTHOPAEDIC SURGERY

## 2025-05-30 PROCEDURE — 1200000000 HC SEMI PRIVATE

## 2025-05-30 PROCEDURE — 73130 X-RAY EXAM OF HAND: CPT

## 2025-05-30 PROCEDURE — 2500000003 HC RX 250 WO HCPCS: Performed by: ORTHOPAEDIC SURGERY

## 2025-05-30 PROCEDURE — 2500000003 HC RX 250 WO HCPCS: Performed by: INTERNAL MEDICINE

## 2025-05-30 PROCEDURE — 87075 CULTR BACTERIA EXCEPT BLOOD: CPT

## 2025-05-30 RX ORDER — LIDOCAINE HYDROCHLORIDE AND EPINEPHRINE 10; 10 MG/ML; UG/ML
20 INJECTION, SOLUTION INFILTRATION; PERINEURAL ONCE
Status: DISCONTINUED | OUTPATIENT
Start: 2025-05-30 | End: 2025-06-02 | Stop reason: HOSPADM

## 2025-05-30 RX ORDER — ACETAMINOPHEN 325 MG/1
650 TABLET ORAL EVERY 6 HOURS PRN
Status: DISCONTINUED | OUTPATIENT
Start: 2025-05-30 | End: 2025-06-02 | Stop reason: HOSPADM

## 2025-05-30 RX ORDER — VANCOMYCIN 1 G/200ML
1000 INJECTION, SOLUTION INTRAVENOUS ONCE
Status: COMPLETED | OUTPATIENT
Start: 2025-05-30 | End: 2025-05-30

## 2025-05-30 RX ORDER — SODIUM CHLORIDE 0.9 % (FLUSH) 0.9 %
5-40 SYRINGE (ML) INJECTION EVERY 12 HOURS SCHEDULED
Status: DISCONTINUED | OUTPATIENT
Start: 2025-05-30 | End: 2025-06-02 | Stop reason: HOSPADM

## 2025-05-30 RX ORDER — GENTAMICIN IN NACL, ISO-OSM 100MG/0.1L
300 INTRAVENOUS SOLUTION, PIGGYBACK (ML) INTRAVENOUS ONCE
Status: DISCONTINUED | OUTPATIENT
Start: 2025-05-30 | End: 2025-05-30

## 2025-05-30 RX ORDER — ACETAMINOPHEN 650 MG/1
650 SUPPOSITORY RECTAL EVERY 6 HOURS PRN
Status: DISCONTINUED | OUTPATIENT
Start: 2025-05-30 | End: 2025-06-02 | Stop reason: HOSPADM

## 2025-05-30 RX ORDER — SODIUM CHLORIDE 0.9 % (FLUSH) 0.9 %
5-40 SYRINGE (ML) INJECTION PRN
Status: DISCONTINUED | OUTPATIENT
Start: 2025-05-30 | End: 2025-06-02 | Stop reason: HOSPADM

## 2025-05-30 RX ORDER — ASPIRIN 81 MG/1
81 TABLET ORAL DAILY
Status: DISCONTINUED | OUTPATIENT
Start: 2025-05-31 | End: 2025-06-02 | Stop reason: HOSPADM

## 2025-05-30 RX ORDER — VANCOMYCIN 1.75 G/350ML
15 INJECTION, SOLUTION INTRAVENOUS EVERY 12 HOURS
Status: DISCONTINUED | OUTPATIENT
Start: 2025-05-30 | End: 2025-05-30

## 2025-05-30 RX ORDER — FOLIC ACID 1 MG/1
1 TABLET ORAL DAILY
Status: DISCONTINUED | OUTPATIENT
Start: 2025-05-31 | End: 2025-06-02 | Stop reason: HOSPADM

## 2025-05-30 RX ORDER — ONDANSETRON 2 MG/ML
4 INJECTION INTRAMUSCULAR; INTRAVENOUS EVERY 6 HOURS PRN
Status: DISCONTINUED | OUTPATIENT
Start: 2025-05-30 | End: 2025-06-02 | Stop reason: HOSPADM

## 2025-05-30 RX ORDER — LISINOPRIL 20 MG/1
20 TABLET ORAL DAILY
Status: DISCONTINUED | OUTPATIENT
Start: 2025-05-31 | End: 2025-06-02 | Stop reason: HOSPADM

## 2025-05-30 RX ORDER — LACTOBACILLUS RHAMNOSUS GG 10B CELL
1 CAPSULE ORAL DAILY
Status: DISCONTINUED | OUTPATIENT
Start: 2025-05-31 | End: 2025-06-02 | Stop reason: HOSPADM

## 2025-05-30 RX ORDER — ATORVASTATIN CALCIUM 20 MG/1
20 TABLET, FILM COATED ORAL DAILY
Status: DISCONTINUED | OUTPATIENT
Start: 2025-05-31 | End: 2025-06-02 | Stop reason: HOSPADM

## 2025-05-30 RX ORDER — POLYETHYLENE GLYCOL 3350 17 G/17G
17 POWDER, FOR SOLUTION ORAL DAILY PRN
Status: DISCONTINUED | OUTPATIENT
Start: 2025-05-30 | End: 2025-06-02 | Stop reason: HOSPADM

## 2025-05-30 RX ORDER — ONDANSETRON 4 MG/1
4 TABLET, ORALLY DISINTEGRATING ORAL EVERY 8 HOURS PRN
Status: DISCONTINUED | OUTPATIENT
Start: 2025-05-30 | End: 2025-06-02 | Stop reason: HOSPADM

## 2025-05-30 RX ORDER — CLOPIDOGREL BISULFATE 75 MG/1
75 TABLET ORAL DAILY
Status: DISCONTINUED | OUTPATIENT
Start: 2025-05-31 | End: 2025-06-02 | Stop reason: HOSPADM

## 2025-05-30 RX ORDER — ENOXAPARIN SODIUM 100 MG/ML
30 INJECTION SUBCUTANEOUS DAILY
Status: DISCONTINUED | OUTPATIENT
Start: 2025-05-31 | End: 2025-06-02 | Stop reason: HOSPADM

## 2025-05-30 RX ORDER — PANTOPRAZOLE SODIUM 40 MG/1
40 TABLET, DELAYED RELEASE ORAL
Status: DISCONTINUED | OUTPATIENT
Start: 2025-05-31 | End: 2025-06-02 | Stop reason: HOSPADM

## 2025-05-30 RX ORDER — BUPIVACAINE HYDROCHLORIDE AND EPINEPHRINE 5; 5 MG/ML; UG/ML
INJECTION, SOLUTION EPIDURAL; INTRACAUDAL; PERINEURAL
Status: DISPENSED
Start: 2025-05-30 | End: 2025-05-31

## 2025-05-30 RX ORDER — BUPIVACAINE HYDROCHLORIDE AND EPINEPHRINE 2.5; 5 MG/ML; UG/ML
10 INJECTION, SOLUTION EPIDURAL; INFILTRATION; INTRACAUDAL; PERINEURAL ONCE
Status: COMPLETED | OUTPATIENT
Start: 2025-05-30 | End: 2025-05-30

## 2025-05-30 RX ORDER — SODIUM CHLORIDE 9 MG/ML
INJECTION, SOLUTION INTRAVENOUS PRN
Status: DISCONTINUED | OUTPATIENT
Start: 2025-05-30 | End: 2025-06-02 | Stop reason: HOSPADM

## 2025-05-30 RX ORDER — ACETAMINOPHEN 500 MG
500 TABLET ORAL EVERY 6 HOURS PRN
Status: DISCONTINUED | OUTPATIENT
Start: 2025-05-30 | End: 2025-05-30

## 2025-05-30 RX ADMIN — GENTAMICIN SULFATE 300 MG: 40 INJECTION, SOLUTION INTRAMUSCULAR; INTRAVENOUS at 21:44

## 2025-05-30 RX ADMIN — SODIUM CHLORIDE, PRESERVATIVE FREE 10 ML: 5 INJECTION INTRAVENOUS at 21:44

## 2025-05-30 RX ADMIN — BUPIVACAINE HYDROCHLORIDE AND EPINEPHRINE BITARTRATE 10 ML: 2.5; .0091 INJECTION, SOLUTION EPIDURAL; INFILTRATION; INTRACAUDAL; PERINEURAL at 20:45

## 2025-05-30 RX ADMIN — VANCOMYCIN 1000 MG: 1 INJECTION, SOLUTION INTRAVENOUS at 16:38

## 2025-05-30 ASSESSMENT — PAIN DESCRIPTION - ORIENTATION: ORIENTATION: RIGHT

## 2025-05-30 ASSESSMENT — PAIN DESCRIPTION - LOCATION: LOCATION: HAND

## 2025-05-30 ASSESSMENT — PAIN SCALES - GENERAL: PAINLEVEL_OUTOF10: 5

## 2025-05-30 ASSESSMENT — PAIN DESCRIPTION - DESCRIPTORS: DESCRIPTORS: OTHER (COMMENT)

## 2025-05-30 ASSESSMENT — PAIN - FUNCTIONAL ASSESSMENT: PAIN_FUNCTIONAL_ASSESSMENT: 0-10

## 2025-05-30 NOTE — ED TRIAGE NOTES
ER return for hand injury. Pt states a rooster spur poked RT hand. Hand now red, swollen and warm to the touch. Denies fevers +PMS

## 2025-05-30 NOTE — ED PROVIDER NOTES
[facility]  EMERGENCY DEPARTMENT ENCOUNTER      Pt Name: Lloyd Greenberg  MRN: [id number]  Birthdate 1940  Date of evaluation: 5/30/2025  Provider: Felix Ennis MD  Time Note started  3:20 PM EDT  5/30/25           NURSING NOTES REVIEWED     Pt evaluated in a timely manner      CURRENT MEDICATIONS       Previous Medications    ACETAMINOPHEN (TYLENOL) 500 MG TABLET    Take 1 tablet by mouth every 6 hours as needed for Pain    ASPIRIN 81 MG EC TABLET    Take 1 tablet by mouth daily    CLOPIDOGREL (PLAVIX) 75 MG TABLET    Take 1 tablet by mouth daily    EMPAGLIFLOZIN (JARDIANCE) 25 MG TABLET    Take 1 tablet by mouth daily 1/2 tablet daily for renal disease    FOLIC ACID (FOLVITE) 1 MG TABLET    Take 1 tablet by mouth daily    LACTOBACILLUS (PROBIOTIC ACIDOPHILUS PO)    Take 1 tablet by mouth daily    LISINOPRIL (PRINIVIL;ZESTRIL) 20 MG TABLET    Take 1 tablet by mouth daily    OMEPRAZOLE (PRILOSEC) 20 MG DELAYED RELEASE CAPSULE    Take 1 capsule by mouth daily    SIMVASTATIN (ZOCOR) 40 MG TABLET    Take 1 tablet by mouth nightly    SULFAMETHOXAZOLE-TRIMETHOPRIM (BACTRIM DS) 800-160 MG PER TABLET    Take 1 tablet by mouth 2 times daily for 7 days    VITAMIN B-12 (CYANOCOBALAMIN) 1000 MCG TABLET    Take 1 tablet by mouth daily       ALLERGIES     Pcn [penicillins], Darvon [propoxyphene], Fentanyl, Morphine, and Sodium chloride    FAMILY HISTORY       Family History   Problem Relation Age of Onset    Cancer Mother     Arthritis Father     Cataracts Father     Heart Attack Father     Gall Bladder Disease Father     Dementia Sister     Dementia Brother           SOCIAL HISTORY       Social History     Socioeconomic History    Marital status:      Spouse name: None    Number of children: None    Years of education: None    Highest education level: None   Tobacco Use    Smoking status: Former     Average packs/day: 2.5 packs/day for 24.0 years (60.0 ttl pk-yrs)     Types: Cigarettes

## 2025-05-30 NOTE — CONSULTS
Vancomycin Dosing by Pharmacy - Initial Note   TODAY'S DATE:  5/30/2025  Patient name, age:  Lloyd Greenberg, 84 y.o.    Indication: SSTI, MRSA suspected.      Allergies:  Pcn [penicillins], Darvon [propoxyphene], Fentanyl, Morphine, and Sodium chloride   Actual Weight:    Wt Readings from Last 1 Encounters:   05/30/25 80.3 kg (177 lb)     Labs/Ancillary Data  Estimated Creatinine Clearance: 26 mL/min (A) (based on SCr of 2 mg/dL (H)).  Recent Labs     05/30/25  1540   CREATININE 2.0*   BUN 25*   WBC 8.2     No results found for: \"PROCAL\"  No intake or output data in the 24 hours ending 05/30/25 1611      Recent vancomycin administrations        No vancomycin IV orders with administrations found.            Orders not given:            vancomycin (VANCOCIN) 1000 mg in 200 mL IVPB    vancomycin (VANCOCIN) intermittent dosing (placeholder)                  Culture Date / Source  /  Results      MRSA Nasal Swab: N/A. Non-respiratory infection..    PLAN   Initial loading dose of vancomycin 1000mg IV once  Anticipated dosing is vancomycin 750mg IV every 24 hours if medication to continue. Anticipated AUC 469mg/L.hr and trough at steady state 15mg/L using Bayesian dosing method         Vancomycin Target Concentration Parameters  Treatment  Population Target AUC/DONTE Target Trough   Invasive MRSA Infection (bacteremia, pneumonia, meningitis, endocarditis, osteomyelitis)  Sepsis (undifferentiated) 400-600 N/A   Infection due to non-MRSA pathogen  Empiric treatment of non-invasive MRSA infection  (SSTI, UTI) <500 10-15 mg/L   CrCl < 29 mL/min  Rapidly fluctuating serum creatinine   SAURABH N/A < 15 mg/L     Renal replacement therapy is dosed by levels, per hospital protocol.  Abbreviations  * Pauc: probability that AUC is >400 (efficacy); Pconc: probability that Ctrough is above 20 ?g/mL (toxicity); Tox: Probability of nephrotoxicity, based on Braxton et al. Clin Infect Dis 2009.    Thank you for the consult.    Juliette Petersen, R.Ph.,

## 2025-05-31 LAB
ANION GAP SERPL CALCULATED.3IONS-SCNC: 11 MMOL/L (ref 9–16)
BASOPHILS # BLD: 0.05 K/UL (ref 0–0.2)
BASOPHILS NFR BLD: 1 % (ref 0–2)
BUN SERPL-MCNC: 24 MG/DL (ref 8–23)
BUN/CREAT SERPL: 13 (ref 9–20)
CALCIUM SERPL-MCNC: 8.4 MG/DL (ref 8.6–10.4)
CHLORIDE SERPL-SCNC: 107 MMOL/L (ref 98–107)
CO2 SERPL-SCNC: 23 MMOL/L (ref 20–31)
CREAT SERPL-MCNC: 1.9 MG/DL (ref 0.7–1.2)
CRP SERPL HS-MCNC: 73.7 MG/L (ref 0–5)
EOSINOPHIL # BLD: 0.32 K/UL (ref 0–0.44)
EOSINOPHILS RELATIVE PERCENT: 4 % (ref 1–4)
ERYTHROCYTE [DISTWIDTH] IN BLOOD BY AUTOMATED COUNT: 12.7 % (ref 11.8–14.4)
GFR, ESTIMATED: 34 ML/MIN/1.73M2
GLUCOSE SERPL-MCNC: 107 MG/DL (ref 74–99)
HCT VFR BLD AUTO: 33.8 % (ref 40.7–50.3)
HGB BLD-MCNC: 10.9 G/DL (ref 13–17)
IMM GRANULOCYTES # BLD AUTO: <0.03 K/UL (ref 0–0.3)
IMM GRANULOCYTES NFR BLD: 0 %
LYMPHOCYTES NFR BLD: 0.77 K/UL (ref 1.1–3.7)
LYMPHOCYTES RELATIVE PERCENT: 10 % (ref 24–43)
MAGNESIUM SERPL-MCNC: 1.9 MG/DL (ref 1.6–2.4)
MCH RBC QN AUTO: 27.8 PG (ref 25.2–33.5)
MCHC RBC AUTO-ENTMCNC: 32.2 G/DL (ref 28.4–34.8)
MCV RBC AUTO: 86.2 FL (ref 82.6–102.9)
MONOCYTES NFR BLD: 0.68 K/UL (ref 0.1–1.2)
MONOCYTES NFR BLD: 9 % (ref 3–12)
NEUTROPHILS NFR BLD: 76 % (ref 36–65)
NEUTS SEG NFR BLD: 5.8 K/UL (ref 1.5–8.1)
NRBC BLD-RTO: 0 PER 100 WBC
PLATELET # BLD AUTO: 291 K/UL (ref 138–453)
PMV BLD AUTO: 9.2 FL (ref 8.1–13.5)
POTASSIUM SERPL-SCNC: 3.5 MMOL/L (ref 3.7–5.3)
RBC # BLD AUTO: 3.92 M/UL (ref 4.21–5.77)
SODIUM SERPL-SCNC: 141 MMOL/L (ref 136–145)
WBC OTHER # BLD: 7.6 K/UL (ref 3.5–11.3)

## 2025-05-31 PROCEDURE — 83735 ASSAY OF MAGNESIUM: CPT

## 2025-05-31 PROCEDURE — 6360000002 HC RX W HCPCS: Performed by: INTERNAL MEDICINE

## 2025-05-31 PROCEDURE — 6360000002 HC RX W HCPCS: Performed by: ORTHOPAEDIC SURGERY

## 2025-05-31 PROCEDURE — 86140 C-REACTIVE PROTEIN: CPT

## 2025-05-31 PROCEDURE — 6370000000 HC RX 637 (ALT 250 FOR IP): Performed by: INTERNAL MEDICINE

## 2025-05-31 PROCEDURE — 85025 COMPLETE CBC W/AUTO DIFF WBC: CPT

## 2025-05-31 PROCEDURE — 36415 COLL VENOUS BLD VENIPUNCTURE: CPT

## 2025-05-31 PROCEDURE — 0RBN0ZZ EXCISION OF RIGHT WRIST JOINT, OPEN APPROACH: ICD-10-PCS | Performed by: ORTHOPAEDIC SURGERY

## 2025-05-31 PROCEDURE — 97161 PT EVAL LOW COMPLEX 20 MIN: CPT

## 2025-05-31 PROCEDURE — 2500000003 HC RX 250 WO HCPCS: Performed by: INTERNAL MEDICINE

## 2025-05-31 PROCEDURE — 94761 N-INVAS EAR/PLS OXIMETRY MLT: CPT

## 2025-05-31 PROCEDURE — 0LB70ZZ EXCISION OF RIGHT HAND TENDON, OPEN APPROACH: ICD-10-PCS | Performed by: ORTHOPAEDIC SURGERY

## 2025-05-31 PROCEDURE — 97166 OT EVAL MOD COMPLEX 45 MIN: CPT

## 2025-05-31 PROCEDURE — 2500000003 HC RX 250 WO HCPCS: Performed by: ORTHOPAEDIC SURGERY

## 2025-05-31 PROCEDURE — 0JBJ0ZZ EXCISION OF RIGHT HAND SUBCUTANEOUS TISSUE AND FASCIA, OPEN APPROACH: ICD-10-PCS | Performed by: ORTHOPAEDIC SURGERY

## 2025-05-31 PROCEDURE — 80048 BASIC METABOLIC PNL TOTAL CA: CPT

## 2025-05-31 PROCEDURE — 1200000000 HC SEMI PRIVATE

## 2025-05-31 RX ORDER — BUPIVACAINE HYDROCHLORIDE AND EPINEPHRINE 2.5; 5 MG/ML; UG/ML
30 INJECTION, SOLUTION EPIDURAL; INFILTRATION; INTRACAUDAL; PERINEURAL ONCE
Status: COMPLETED | OUTPATIENT
Start: 2025-05-31 | End: 2025-05-31

## 2025-05-31 RX ORDER — CLINDAMYCIN PHOSPHATE 900 MG/50ML
900 INJECTION, SOLUTION INTRAVENOUS EVERY 8 HOURS
Status: DISCONTINUED | OUTPATIENT
Start: 2025-05-31 | End: 2025-05-31

## 2025-05-31 RX ORDER — METRONIDAZOLE 500 MG/100ML
500 INJECTION, SOLUTION INTRAVENOUS EVERY 8 HOURS
Status: DISCONTINUED | OUTPATIENT
Start: 2025-05-31 | End: 2025-06-02 | Stop reason: HOSPADM

## 2025-05-31 RX ORDER — POTASSIUM CHLORIDE 7.45 MG/ML
10 INJECTION INTRAVENOUS PRN
Status: DISCONTINUED | OUTPATIENT
Start: 2025-05-31 | End: 2025-06-02 | Stop reason: HOSPADM

## 2025-05-31 RX ORDER — POTASSIUM CHLORIDE 1500 MG/1
40 TABLET, EXTENDED RELEASE ORAL PRN
Status: DISCONTINUED | OUTPATIENT
Start: 2025-05-31 | End: 2025-06-02 | Stop reason: HOSPADM

## 2025-05-31 RX ADMIN — CLINDAMYCIN PHOSPHATE 900 MG: 900 INJECTION, SOLUTION INTRAVENOUS at 13:25

## 2025-05-31 RX ADMIN — ASPIRIN 81 MG: 81 TABLET, COATED ORAL at 13:18

## 2025-05-31 RX ADMIN — METRONIDAZOLE 500 MG: 500 INJECTION, SOLUTION INTRAVENOUS at 14:54

## 2025-05-31 RX ADMIN — ACETAMINOPHEN 650 MG: 325 TABLET ORAL at 19:47

## 2025-05-31 RX ADMIN — Medication 1 CAPSULE: at 13:18

## 2025-05-31 RX ADMIN — CLOPIDOGREL BISULFATE 75 MG: 75 TABLET, FILM COATED ORAL at 13:18

## 2025-05-31 RX ADMIN — BUPIVACAINE HYDROCHLORIDE AND EPINEPHRINE BITARTRATE 20 ML: 2.5; .005 INJECTION, SOLUTION EPIDURAL; INFILTRATION; INTRACAUDAL; PERINEURAL at 12:56

## 2025-05-31 RX ADMIN — POTASSIUM CHLORIDE 40 MEQ: 1500 TABLET, EXTENDED RELEASE ORAL at 16:04

## 2025-05-31 RX ADMIN — SODIUM CHLORIDE, PRESERVATIVE FREE 10 ML: 5 INJECTION INTRAVENOUS at 13:19

## 2025-05-31 RX ADMIN — WATER 2000 MG: 1 INJECTION INTRAMUSCULAR; INTRAVENOUS; SUBCUTANEOUS at 21:40

## 2025-05-31 RX ADMIN — LISINOPRIL 20 MG: 20 TABLET ORAL at 13:17

## 2025-05-31 RX ADMIN — ENOXAPARIN SODIUM 30 MG: 100 INJECTION SUBCUTANEOUS at 13:17

## 2025-05-31 RX ADMIN — METRONIDAZOLE 500 MG: 500 INJECTION, SOLUTION INTRAVENOUS at 21:45

## 2025-05-31 RX ADMIN — FOLIC ACID 1 MG: 1 TABLET ORAL at 13:18

## 2025-05-31 RX ADMIN — PANTOPRAZOLE SODIUM 40 MG: 40 TABLET, DELAYED RELEASE ORAL at 13:18

## 2025-05-31 RX ADMIN — SODIUM CHLORIDE, PRESERVATIVE FREE 10 ML: 5 INJECTION INTRAVENOUS at 21:40

## 2025-05-31 RX ADMIN — ATORVASTATIN CALCIUM 20 MG: 20 TABLET, FILM COATED ORAL at 13:18

## 2025-05-31 RX ADMIN — EMPAGLIFLOZIN 25 MG: 25 TABLET, FILM COATED ORAL at 13:18

## 2025-05-31 RX ADMIN — WATER 2000 MG: 1 INJECTION INTRAMUSCULAR; INTRAVENOUS; SUBCUTANEOUS at 14:52

## 2025-05-31 ASSESSMENT — PAIN SCALES - GENERAL
PAINLEVEL_OUTOF10: 1
PAINLEVEL_OUTOF10: 2

## 2025-05-31 ASSESSMENT — PAIN DESCRIPTION - LOCATION
LOCATION: HAND

## 2025-05-31 ASSESSMENT — PAIN DESCRIPTION - PAIN TYPE: TYPE: ACUTE PAIN

## 2025-05-31 ASSESSMENT — PAIN DESCRIPTION - ONSET
ONSET: ON-GOING
ONSET: ON-GOING

## 2025-05-31 ASSESSMENT — PAIN DESCRIPTION - ORIENTATION
ORIENTATION: RIGHT

## 2025-05-31 ASSESSMENT — PAIN DESCRIPTION - DESCRIPTORS
DESCRIPTORS: THROBBING
DESCRIPTORS: ACHING
DESCRIPTORS: OTHER (COMMENT);THROBBING

## 2025-05-31 ASSESSMENT — PAIN DESCRIPTION - FREQUENCY
FREQUENCY: CONTINUOUS
FREQUENCY: CONTINUOUS

## 2025-05-31 NOTE — OP NOTE
Operative Note      Patient: Lloyd Greenberg  YOB: 1940  MRN: [id number]    Date of Procedure: 5/31/2025    Preoperative diagnosis: Right hand cellulitis and abcess status post rooster willow punctures 8 days ago with surgical washout yesterday with severe abscess, resolving cellulitis dorsal aspect of hand following  approximately inch and a half long puncture wound from rooster 7 days ago in 2 locations 1 on the dorsal aspect of the hand overlying the 2nd and 3rd extensor digitorum communis tendons with abscess extending ulnarly, the second location overlying the base of the thumb overlying the extensor pollicis longus and ECRB/ECRL tendons, swollen hand with extensor tenosynovitis infectious in etiology dorsal aspect of hand, cannot rule out deeper involvement, such as wrist joint involvement, or bony involvement.  Please note Dr. Boykin was present this morning we discussed patient in the presence of his wife and patient and I have personally had communication with Nena, pharmacist in pharmacy.  They cannot do blood levels of gentamicin and therefore recommended to switch antibiotics.  MRI cannot be completed till Monday.  Patient presents for second surgical washout today     Please note this severe infection is a result of rooster willow puncture wounds 8 days old with surgical washout in the emergency room May 30, 2025 in this patient with multiple comorbidities.  Patient and family had risk and benefits of surgery discussed and consent obtained     Post-Op Diagnosis: Same       Procedure: Irrigation debridement right hand puncture wounds overlying the dorsal aspect of the thumb as well as the dorsal aspect of the hand, exploration with 3.5 loupe magnification, re evacuation dorsal hand abscess with serosanguineous drainage noted today no purulence, extensor pollicis longus, extensor carpi radialis longus, extensor carpi radialis brevis, and extensor digitorum communis tendon to the 2nd, 3rd, 4th and

## 2025-05-31 NOTE — CONSULTS
Department of Orthopedic Surgery  Orthopedic Consult Note        Reason for Consult:   right hand swelling /cellulitis  Requesting Physician:   Felix Guadalupe    CHIEF COMPLAINT:  Cellulitis right hand    History Obtained From:  patient    HISTORY OF PRESENT ILLNESS:                The patient is a 84 y.o. male who presents with  right hand swelling.  On 05/24/2025 patient was feeding his son's chickens and a rooster came up and hit patient with the Jesus on the medial dorsal part of the hand as well as the radial aspect just proximal of the base of the thumb.  Patient was seen in the emergency department for this was placed on Bactrim.  Patient continued to complain of pain in the hand and [facility] [place] who told him to go directly to the emergency department.  Upon arrival to the emergency department patient had x-rays that were negative for any acute fractures he did have a significant amount of swelling to his hand unable to completely flex his fingers secondary to swelling he does have motion of his thumb but is states that is also tight due to swelling.  The swelling is also tracking proximally and he has some mild erythema noted at the dorsal aspect of his wrist as well as erythema over the dorsum of the hand.  Patient denies having any fever or chills.    Past Medical History:        Diagnosis Date    Arthritis     Diabetes mellitus (HCC)     History of prostate cancer     Hyperlipidemia     Hypertension     Kidney stone     TIA (transient ischemic attack)     Multiple     Past Surgical History:        Procedure Laterality Date    ABDOMEN SURGERY      Bowel obstruction    APPENDECTOMY      COLON SURGERY      COLONOSCOPY      CYSTOSCOPY      x4    HERNIA REPAIR      KIDNEY STONE REMOVAL      UPPER GASTROINTESTINAL ENDOSCOPY  10/27/2020    EGD BIOPSY and polypectomy (N/A ) - Dr. Valdez     UPPER GASTROINTESTINAL ENDOSCOPY N/A 10/27/2020    EGD BIOPSY and polypectomy performed

## 2025-05-31 NOTE — PLAN OF CARE
Problem: Chronic Conditions and Co-morbidities  Goal: Patient's chronic conditions and co-morbidity symptoms are monitored and maintained or improved  Outcome: Progressing  Flowsheets (Taken 5/30/2025 2107)  Care Plan - Patient's Chronic Conditions and Co-Morbidity Symptoms are Monitored and Maintained or Improved: Monitor and assess patient's chronic conditions and comorbid symptoms for stability, deterioration, or improvement     Problem: Discharge Planning  Goal: Discharge to home or other facility with appropriate resources  Outcome: Progressing  Flowsheets (Taken 5/30/2025 2107)  Discharge to home or other facility with appropriate resources: Identify barriers to discharge with patient and caregiver     Problem: Pain  Goal: Verbalizes/displays adequate comfort level or baseline comfort level  Outcome: Progressing  Flowsheets (Taken 5/30/2025 2107)  Verbalizes/displays adequate comfort level or baseline comfort level: Encourage patient to monitor pain and request assistance     Problem: Safety - Adult  Goal: Free from fall injury  Outcome: Progressing

## 2025-05-31 NOTE — ED PROVIDER NOTES
Care of Lloyd Greenberg was assumed from previous attending and is being seen for Cellulitis  .  The patient's initial evaluation and plan have been discussed with the prior provider who initially evaluated the patient.    Handoff taken on the following patient from prior Attending Physician:Jerod 8:13 PM EDT      Attestation    I was available and discussed any additional care issues that arose and coordinated the management plans with the resident(s) caring for the patient during my duty period. Any areas of disagreement with resident’s documentation of care or procedures are noted on the chart. I was personally present for the key portions of any/all procedures during my duty period. I have documented in the chart those procedures where I was not present during the key portions.      EMERGENCY DEPARTMENT COURSE / MEDICAL DECISION MAKING:       MEDICATIONS GIVEN:  Orders Placed This Encounter   Medications    vancomycin (VANCOCIN) 1000 mg in 200 mL IVPB     Antimicrobial Indications:   Skin and Soft Tissue Infection    vancomycin (VANCOCIN) intermittent dosing (placeholder)     Antimicrobial Indications:   Skin and Soft Tissue Infection    lidocaine-EPINEPHrine 1 %-1:883257 injection 20 mL    BUPivacaine-EPINEPHrine PF (MARCAINE-w/EPINEPHrine) 0.25% -1:553787 injection 10 mL    BUPivacaine-EPINEPHrine PF (MARCAINE-w/EPINEPHrine) 0.5% -1:411327 injection     Alessia Collazo: cabinet override    gentamicin (GARAMYCIN) 401.6 mg in sodium chloride 0.9 % 250 mL IVPB     Antimicrobial Indications:   Skin and Soft Tissue Infection       LABS / RADIOLOGY:     Labs Reviewed   CBC WITH AUTO DIFFERENTIAL - Abnormal; Notable for the following components:       Result Value    RBC 3.94 (*)     Hemoglobin 11.0 (*)     Hematocrit 34.1 (*)     Neutrophils % 78 (*)     Lymphocytes % 10 (*)     Lymphocytes Absolute 0.80 (*)     All other components within normal limits   BASIC METABOLIC PANEL - Abnormal; Notable for the

## 2025-05-31 NOTE — H&P
Harpreet Boykin M.D.  Internal Medicine History and Phyisical    Patient: Lloyd Greenberg  Date of Admission: 5/30/2025  3:08 PM  Date of Evaluation: 5/31/2025      Subjective:  Chief Complaint:    Chief Complaint   Patient presents with    Cellulitis       History Obtained From:  patient, electronic medical record  PCP: Clint Houston MD      History of Present Illness:   Lloyd Greenberg is a 84 y.o. male who presented to the ER yesterday for erythema / swelling of his right hand 7 days after skin puncture by a michelle loyaon .  He was seen in ER for this issue on 5/26/25.  No labs were ordered at that time however hand Xrays were negative and he was DC'd home on Bactrim.  Her returned to ER yesterday due to worsening swelling / pain.  Dr. Fuentes was consulted from ER and performed I/D at bedside.        Review of Systems:  Constitutional:negative  for fevers, and negative for chills.  Respiratory: negative for shortness of breath, negative for cough, and negative for wheezing  Cardiovascular: negative for chest pain, negative for palpitations, and negative for syncope  Gastrointestinal: negative for abdominal pain, negative for nausea,negative for vomiting, negative for diarrhea, negative for constipation, and negative for hematochezia or melena  Genitourinary: negative for dysuria, negative for urinary urgency, negative for urinary frequency, and negative for hematuria  Neurological: negative for unilateral weakness, numbness or tingling.    All other systems were reviewed with the patient and are negative except as stated above      Past Medical History:        Diagnosis Date    Arthritis     Diabetes mellitus (HCC)     History of prostate cancer     Hyperlipidemia     Hypertension     Kidney stone     TIA (transient ischemic attack)     Multiple       Past Surgical History:        Procedure Laterality Date    ABDOMEN SURGERY      Bowel obstruction    APPENDECTOMY      COLON SURGERY      COLONOSCOPY      CYSTOSCOPY

## 2025-05-31 NOTE — OP NOTE
Operative Note      Patient: Lloyd Greenberg  YOB: 1940  MRN: [id number]    Date of Procedure: 05/31/2025    Operative Note        Patient: Lloyd Greenberg  YOB: 1940  MRN: [id number]     Date of Procedure: 5/31/2025     Preoperative diagnosis: Right hand cellulitis and abcess status post rooster willow punctures 8 days ago with surgical washout yesterday with severe abscess, resolving cellulitis dorsal aspect of hand following  approximately inch and a half long puncture wound from rooster 7 days ago in 2 locations 1 on the dorsal aspect of the hand overlying the 2nd and 3rd extensor digitorum communis tendons with abscess extending ulnarly, the second location overlying the base of the thumb overlying the extensor pollicis longus and ECRB/ECRL tendons, swollen hand with extensor tenosynovitis infectious in etiology dorsal aspect of hand, cannot rule out deeper involvement, such as wrist joint involvement, or bony involvement.  Please note Dr. Boykin was present this morning we discussed patient in the presence of his wife and patient and I have personally had communication with Nena pharmacist in pharmacy.  They cannot do blood levels of gentamicin and therefore recommended to switch antibiotics.  MRI cannot be completed till Monday.  Patient presents for second surgical washout today     Please note this severe infection is a result of rooster willow puncture wounds 8 days old with surgical washout in the emergency room May 30, 2025 in this patient with multiple comorbidities.  Patient and family had risk and benefits of surgery discussed and consent obtained     Post-Op Diagnosis: Same       Procedure: Irrigation debridement right hand puncture wounds overlying the dorsal aspect of the thumb as well as the dorsal aspect of the hand, exploration with 3.5 loupe magnification, re evacuation dorsal hand abscess with serosanguineous drainage noted today no purulence, extensor pollicis longus, extensor

## 2025-05-31 NOTE — PLAN OF CARE
Problem: Chronic Conditions and Co-morbidities  Goal: Patient's chronic conditions and co-morbidity symptoms are monitored and maintained or improved  5/31/2025 1524 by Kenyetta Lambert RN  Outcome: Progressing     Problem: Discharge Planning  Goal: Discharge to home or other facility with appropriate resources  5/31/2025 1524 by Kenyetta Lambert RN  Outcome: Progressing     Problem: Pain  Goal: Verbalizes/displays adequate comfort level or baseline comfort level  5/31/2025 1524 by Kenyetta Lambert RN  Outcome: Progressing     Problem: Safety - Adult  Goal: Free from fall injury  5/31/2025 1524 by Kenyetta Lambert RN  Outcome: Progressing

## 2025-05-31 NOTE — OP NOTE
Operative Note      Patient: Lloyd Greenberg  YOB: 1940  MRN: [id number]    Date of Procedure: 5/30/2025    Preoperative diagnosis: Right hand cellulitis and abcess status post rooster willow punctures 7 days ago,  dorsal aspect of hand following  approximately inch and a half long puncture wound from rooster 7 days ago in 2 locations 1 on the dorsal aspect of the hand overlying the 2nd and 3rd extensor digitorum communis tendons with abscess extending ulnarly, the second location overlying the base of the thumb overlying the extensor pollicis longus and ECRB/ECRL tendons, swollen hand with extensor tenosynovitis infectious in etiology with fluctuant abscess notable dorsal aspect of hand, cannot rule out deeper involvement or bony involvement.    Please note this severe infection is a result of rooster willow puncture wounds 7 days old with progressive worsening and pain    Post-Op Diagnosis: Same       Procedure: Irrigation debridement right hand puncture wounds overlying the dorsal aspect of the thumb as well as the dorsal aspect of the hand, exploration with 3.5 loupe magnification, evacuation dorsal hand abscess with aerobic anaerobic and fungal cultures gross purulence noted, extensor pollicis longus, extensor carpi radialis longus, extensor carpi radialis brevis, and extensor digitorum communis tendon tenosynovectomy and tenolysis in this patient with chronic infection and infectious tenosynovitis-patient had removal of the synovial membrane/synovectomy of the extensor tendon sheath at the location overlying the thumb and extensor pollicis longus,.  Patient also had removal of the synovial membrane/synovectomy of the extensor tendon sheath of the extensor carpi radialis longus, extensor carpi radialis brevis and extensor digitorum communis of the 2nd, 3rd, 4th and 5th digits.  This was a radical tenosynovectomy involving the removal of the infectious inflamed synovial membrane surrounding the tendons

## 2025-06-01 LAB
ANION GAP SERPL CALCULATED.3IONS-SCNC: 13 MMOL/L (ref 9–16)
BASOPHILS # BLD: 0.06 K/UL (ref 0–0.2)
BASOPHILS NFR BLD: 1 % (ref 0–2)
BUN SERPL-MCNC: 24 MG/DL (ref 8–23)
BUN/CREAT SERPL: 11 (ref 9–20)
CALCIUM SERPL-MCNC: 8.7 MG/DL (ref 8.6–10.4)
CHLORIDE SERPL-SCNC: 106 MMOL/L (ref 98–107)
CO2 SERPL-SCNC: 21 MMOL/L (ref 20–31)
CREAT SERPL-MCNC: 2.1 MG/DL (ref 0.7–1.2)
CRP SERPL HS-MCNC: 59.9 MG/L (ref 0–5)
EOSINOPHIL # BLD: 0.25 K/UL (ref 0–0.44)
EOSINOPHILS RELATIVE PERCENT: 3 % (ref 1–4)
ERYTHROCYTE [DISTWIDTH] IN BLOOD BY AUTOMATED COUNT: 12.7 % (ref 11.8–14.4)
ERYTHROCYTE [SEDIMENTATION RATE] IN BLOOD BY PHOTOMETRIC METHOD: 49 MM/HR (ref 0–20)
GFR, ESTIMATED: 31 ML/MIN/1.73M2
GLUCOSE SERPL-MCNC: 95 MG/DL (ref 74–99)
HCT VFR BLD AUTO: 35.2 % (ref 40.7–50.3)
HGB BLD-MCNC: 11.3 G/DL (ref 13–17)
IMM GRANULOCYTES # BLD AUTO: <0.03 K/UL (ref 0–0.3)
IMM GRANULOCYTES NFR BLD: 0 %
LYMPHOCYTES NFR BLD: 0.67 K/UL (ref 1.1–3.7)
LYMPHOCYTES RELATIVE PERCENT: 8 % (ref 24–43)
MCH RBC QN AUTO: 27.7 PG (ref 25.2–33.5)
MCHC RBC AUTO-ENTMCNC: 32.1 G/DL (ref 28.4–34.8)
MCV RBC AUTO: 86.3 FL (ref 82.6–102.9)
MONOCYTES NFR BLD: 0.71 K/UL (ref 0.1–1.2)
MONOCYTES NFR BLD: 8 % (ref 3–12)
NEUTROPHILS NFR BLD: 80 % (ref 36–65)
NEUTS SEG NFR BLD: 6.93 K/UL (ref 1.5–8.1)
NRBC BLD-RTO: 0 PER 100 WBC
PLATELET # BLD AUTO: 336 K/UL (ref 138–453)
PMV BLD AUTO: 9.5 FL (ref 8.1–13.5)
POTASSIUM SERPL-SCNC: 3.7 MMOL/L (ref 3.7–5.3)
RBC # BLD AUTO: 4.08 M/UL (ref 4.21–5.77)
SODIUM SERPL-SCNC: 140 MMOL/L (ref 136–145)
WBC OTHER # BLD: 8.6 K/UL (ref 3.5–11.3)

## 2025-06-01 PROCEDURE — 97110 THERAPEUTIC EXERCISES: CPT

## 2025-06-01 PROCEDURE — 6370000000 HC RX 637 (ALT 250 FOR IP): Performed by: INTERNAL MEDICINE

## 2025-06-01 PROCEDURE — 36415 COLL VENOUS BLD VENIPUNCTURE: CPT

## 2025-06-01 PROCEDURE — 2500000003 HC RX 250 WO HCPCS: Performed by: ORTHOPAEDIC SURGERY

## 2025-06-01 PROCEDURE — 2500000003 HC RX 250 WO HCPCS: Performed by: INTERNAL MEDICINE

## 2025-06-01 PROCEDURE — 85025 COMPLETE CBC W/AUTO DIFF WBC: CPT

## 2025-06-01 PROCEDURE — 97535 SELF CARE MNGMENT TRAINING: CPT

## 2025-06-01 PROCEDURE — 6360000002 HC RX W HCPCS: Performed by: ORTHOPAEDIC SURGERY

## 2025-06-01 PROCEDURE — 1200000000 HC SEMI PRIVATE

## 2025-06-01 PROCEDURE — 80048 BASIC METABOLIC PNL TOTAL CA: CPT

## 2025-06-01 PROCEDURE — 94761 N-INVAS EAR/PLS OXIMETRY MLT: CPT

## 2025-06-01 PROCEDURE — 6360000002 HC RX W HCPCS: Performed by: INTERNAL MEDICINE

## 2025-06-01 PROCEDURE — 86140 C-REACTIVE PROTEIN: CPT

## 2025-06-01 PROCEDURE — 97116 GAIT TRAINING THERAPY: CPT

## 2025-06-01 PROCEDURE — 85652 RBC SED RATE AUTOMATED: CPT

## 2025-06-01 RX ORDER — BUPIVACAINE HYDROCHLORIDE AND EPINEPHRINE 2.5; 5 MG/ML; UG/ML
30 INJECTION, SOLUTION EPIDURAL; INFILTRATION; INTRACAUDAL; PERINEURAL ONCE
Status: COMPLETED | OUTPATIENT
Start: 2025-06-01 | End: 2025-06-01

## 2025-06-01 RX ORDER — ERYTHROMYCIN ETHYLSUCCINATE 200 MG/5ML
400 SUSPENSION ORAL
Status: DISCONTINUED | OUTPATIENT
Start: 2025-06-01 | End: 2025-06-02

## 2025-06-01 RX ADMIN — ASPIRIN 81 MG: 81 TABLET, COATED ORAL at 09:34

## 2025-06-01 RX ADMIN — ENOXAPARIN SODIUM 30 MG: 100 INJECTION SUBCUTANEOUS at 09:35

## 2025-06-01 RX ADMIN — CLOPIDOGREL BISULFATE 75 MG: 75 TABLET, FILM COATED ORAL at 09:34

## 2025-06-01 RX ADMIN — ERYTHROMYCIN 400 MG: 200 SUSPENSION ORAL at 21:24

## 2025-06-01 RX ADMIN — EMPAGLIFLOZIN 25 MG: 25 TABLET, FILM COATED ORAL at 09:34

## 2025-06-01 RX ADMIN — Medication 1 CAPSULE: at 09:34

## 2025-06-01 RX ADMIN — WATER 2000 MG: 1 INJECTION INTRAMUSCULAR; INTRAVENOUS; SUBCUTANEOUS at 15:36

## 2025-06-01 RX ADMIN — METRONIDAZOLE 500 MG: 500 INJECTION, SOLUTION INTRAVENOUS at 05:54

## 2025-06-01 RX ADMIN — WATER 2000 MG: 1 INJECTION INTRAMUSCULAR; INTRAVENOUS; SUBCUTANEOUS at 20:23

## 2025-06-01 RX ADMIN — FOLIC ACID 1 MG: 1 TABLET ORAL at 09:34

## 2025-06-01 RX ADMIN — BUPIVACAINE HYDROCHLORIDE AND EPINEPHRINE BITARTRATE 10 ML: 2.5; .005 INJECTION, SOLUTION EPIDURAL; INFILTRATION; INTRACAUDAL; PERINEURAL at 14:31

## 2025-06-01 RX ADMIN — WATER 2000 MG: 1 INJECTION INTRAMUSCULAR; INTRAVENOUS; SUBCUTANEOUS at 05:47

## 2025-06-01 RX ADMIN — PANTOPRAZOLE SODIUM 40 MG: 40 TABLET, DELAYED RELEASE ORAL at 07:03

## 2025-06-01 RX ADMIN — LISINOPRIL 20 MG: 20 TABLET ORAL at 09:35

## 2025-06-01 RX ADMIN — ACETAMINOPHEN 650 MG: 325 TABLET ORAL at 09:41

## 2025-06-01 RX ADMIN — ATORVASTATIN CALCIUM 20 MG: 20 TABLET, FILM COATED ORAL at 09:34

## 2025-06-01 RX ADMIN — METRONIDAZOLE 500 MG: 500 INJECTION, SOLUTION INTRAVENOUS at 15:26

## 2025-06-01 RX ADMIN — ERYTHROMYCIN 400 MG: 200 SUSPENSION ORAL at 17:40

## 2025-06-01 RX ADMIN — METRONIDAZOLE 500 MG: 500 INJECTION, SOLUTION INTRAVENOUS at 20:37

## 2025-06-01 RX ADMIN — SODIUM CHLORIDE, PRESERVATIVE FREE 10 ML: 5 INJECTION INTRAVENOUS at 20:23

## 2025-06-01 ASSESSMENT — PAIN DESCRIPTION - ORIENTATION
ORIENTATION: RIGHT;POSTERIOR
ORIENTATION: RIGHT
ORIENTATION: RIGHT

## 2025-06-01 ASSESSMENT — PAIN DESCRIPTION - FREQUENCY: FREQUENCY: INTERMITTENT

## 2025-06-01 ASSESSMENT — PAIN SCALES - GENERAL
PAINLEVEL_OUTOF10: 1

## 2025-06-01 ASSESSMENT — PAIN DESCRIPTION - LOCATION
LOCATION: HAND
LOCATION: WRIST
LOCATION: WRIST;NECK

## 2025-06-01 ASSESSMENT — PAIN DESCRIPTION - DESCRIPTORS
DESCRIPTORS: ACHING

## 2025-06-01 ASSESSMENT — PAIN - FUNCTIONAL ASSESSMENT
PAIN_FUNCTIONAL_ASSESSMENT: ACTIVITIES ARE NOT PREVENTED
PAIN_FUNCTIONAL_ASSESSMENT: PREVENTS OR INTERFERES SOME ACTIVE ACTIVITIES AND ADLS

## 2025-06-01 ASSESSMENT — PAIN DESCRIPTION - ONSET: ONSET: ON-GOING

## 2025-06-01 NOTE — PLAN OF CARE
Problem: Chronic Conditions and Co-morbidities  Goal: Patient's chronic conditions and co-morbidity symptoms are monitored and maintained or improved  6/1/2025 1922 by Baylee Lewis RN  Outcome: Progressing  Flowsheets (Taken 6/1/2025 1922)  Care Plan - Patient's Chronic Conditions and Co-Morbidity Symptoms are Monitored and Maintained or Improved:   Monitor and assess patient's chronic conditions and comorbid symptoms for stability, deterioration, or improvement   Collaborate with multidisciplinary team to address chronic and comorbid conditions and prevent exacerbation or deterioration     Problem: Discharge Planning  Goal: Discharge to home or other facility with appropriate resources  6/1/2025 1922 by Baylee Lewis, RN  Outcome: Progressing  Flowsheets (Taken 6/1/2025 1922)  Discharge to home or other facility with appropriate resources:   Identify barriers to discharge with patient and caregiver   Refer to discharge planning if patient needs post-hospital services based on physician order or complex needs related to functional status, cognitive ability or social support system     Problem: Pain  Goal: Verbalizes/displays adequate comfort level or baseline comfort level  6/1/2025 1922 by Baylee Lewis RN  Outcome: Progressing  Flowsheets (Taken 6/1/2025 1922)  Verbalizes/displays adequate comfort level or baseline comfort level:   Encourage patient to monitor pain and request assistance   Assess pain using appropriate pain scale     Problem: Safety - Adult  Goal: Free from fall injury  6/1/2025 1922 by Baylee Lewis, RN  Outcome: Progressing  Flowsheets (Taken 6/1/2025 1922)  Free From Fall Injury: Instruct family/caregiver on patient safety

## 2025-06-01 NOTE — PLAN OF CARE
Problem: Chronic Conditions and Co-morbidities  Goal: Patient's chronic conditions and co-morbidity symptoms are monitored and maintained or improved  5/31/2025 1524 by Kenyetta Lambert RN  Outcome: Progressing     Problem: Discharge Planning  Goal: Discharge to home or other facility with appropriate resources  Recent Flowsheet Documentation  Taken 5/31/2025 1941 by Shari Oscar RN  Discharge to home or other facility with appropriate resources: Identify barriers to discharge with patient and caregiver  5/31/2025 1524 by Kenyetta Lambert RN  Outcome: Progressing     Problem: Pain  Goal: Verbalizes/displays adequate comfort level or baseline comfort level  5/31/2025 2118 by Shari Oscar RN  Outcome: Progressing  Note: Patient denies any pain, resting comfortably at this time. Plan of care ongoing.   5/31/2025 1524 by Kenyetta Lambert RN  Outcome: Progressing     Problem: Safety - Adult  Goal: Free from fall injury  5/31/2025 2118 by Shari Oscar RN  Outcome: Progressing  Note: Patient will be free from falls while in the hospital. Call light within reach, bed alarm and gripper socks on and denies any needs. Plan of care ongoing.   5/31/2025 1524 by Kenyetta Lambert RN  Outcome: Progressing

## 2025-06-01 NOTE — OP NOTE
Operative Note      Patient: Lloyd Greenberg  YOB: 1940  MRN: [id number]    Date of Procedure: 06/01/2025      Preoperative diagnosis: Puncture wound dorsal aspect right hand/wrist 9 days ago with delayed presentation till he got so painful with infected abscess and cellulitis right hand that he could not use his hand.      Post-Op Diagnosis: Same      Procedure: Irrigation and debridement right hand/wrist abscess with debridement skin, subcutaneous tissue, fascia and tendon    Operation: Patient seen in room [address].  Patient has been on antibiotics Ancef and Flagyl for initial cultures demonstrating gram-negative rods.  Patient with 2 previous washouts May 30 in the emergency room in May 31 at bedside with improvement of clinical symptoms and decreasing CRP from 82.6 on May 30  to 59.9 on today's blood work.  Patient had risks and benefits of surgery discussed and consent obtained.  Patient under sterile conditions underwent removal of packing iodoform gauze from both the dorsal wrist /hand wound as well as the wound over the thumb.  Next, patient had formal prep and drape in sterile fashion with Betadine.  Patient underwent injection of 10 cc half percent Marcaine with epinephrine about the 2 incision/puncture wound sites.  Next, patient had milking of the tissues I did not see any significant serosanguineous fluid or any purulence return.  Patient at this point in time had pickup and scissors used to debride the skin,  the subcutaneous tissue as well as the deeper tissue overlying the dorsal aspect of the hand/wrist including the fascia and the tendon of the extensor digitorum communis.  Over the radial wound over the thumb patient had exploration with irrigation debridement with normal saline with debridement of skin subcutaneous tissue and tendon with extensor pollicis longus tendon visualized and debrided of adherent tenosynovitis.  Patient's tendons intact.  Patient had tolerated procedure

## 2025-06-02 ENCOUNTER — APPOINTMENT (OUTPATIENT)
Dept: MRI IMAGING | Age: 85
DRG: 580 | End: 2025-06-02
Payer: OTHER GOVERNMENT

## 2025-06-02 VITALS
BODY MASS INDEX: 31.19 KG/M2 | HEART RATE: 79 BPM | SYSTOLIC BLOOD PRESSURE: 143 MMHG | OXYGEN SATURATION: 96 % | RESPIRATION RATE: 18 BRPM | HEIGHT: 62 IN | TEMPERATURE: 97.5 F | DIASTOLIC BLOOD PRESSURE: 75 MMHG | WEIGHT: 169.5 LBS

## 2025-06-02 LAB
ANION GAP SERPL CALCULATED.3IONS-SCNC: 9 MMOL/L (ref 9–16)
BASOPHILS # BLD: 0.05 K/UL (ref 0–0.2)
BASOPHILS NFR BLD: 1 % (ref 0–2)
BUN SERPL-MCNC: 25 MG/DL (ref 8–23)
BUN/CREAT SERPL: 12 (ref 9–20)
CALCIUM SERPL-MCNC: 8.5 MG/DL (ref 8.6–10.4)
CHLORIDE SERPL-SCNC: 109 MMOL/L (ref 98–107)
CO2 SERPL-SCNC: 23 MMOL/L (ref 20–31)
CREAT SERPL-MCNC: 2.1 MG/DL (ref 0.7–1.2)
CRP SERPL HS-MCNC: 42 MG/L (ref 0–5)
EOSINOPHIL # BLD: 0.27 K/UL (ref 0–0.44)
EOSINOPHILS RELATIVE PERCENT: 3 % (ref 1–4)
ERYTHROCYTE [DISTWIDTH] IN BLOOD BY AUTOMATED COUNT: 12.9 % (ref 11.8–14.4)
GFR, ESTIMATED: 31 ML/MIN/1.73M2
GLUCOSE SERPL-MCNC: 129 MG/DL (ref 74–99)
HCT VFR BLD AUTO: 33.9 % (ref 40.7–50.3)
HGB BLD-MCNC: 10.8 G/DL (ref 13–17)
IMM GRANULOCYTES # BLD AUTO: <0.03 K/UL (ref 0–0.3)
IMM GRANULOCYTES NFR BLD: 0 %
LYMPHOCYTES NFR BLD: 0.79 K/UL (ref 1.1–3.7)
LYMPHOCYTES RELATIVE PERCENT: 10 % (ref 24–43)
MCH RBC QN AUTO: 27.3 PG (ref 25.2–33.5)
MCHC RBC AUTO-ENTMCNC: 31.9 G/DL (ref 28.4–34.8)
MCV RBC AUTO: 85.8 FL (ref 82.6–102.9)
MICROORGANISM SPEC CULT: ABNORMAL
MICROORGANISM/AGENT SPEC: ABNORMAL
MICROORGANISM/AGENT SPEC: ABNORMAL
MONOCYTES NFR BLD: 0.72 K/UL (ref 0.1–1.2)
MONOCYTES NFR BLD: 9 % (ref 3–12)
NEUTROPHILS NFR BLD: 77 % (ref 36–65)
NEUTS SEG NFR BLD: 6.35 K/UL (ref 1.5–8.1)
NRBC BLD-RTO: 0 PER 100 WBC
PLATELET # BLD AUTO: 330 K/UL (ref 138–453)
PMV BLD AUTO: 9.3 FL (ref 8.1–13.5)
POTASSIUM SERPL-SCNC: 3.6 MMOL/L (ref 3.7–5.3)
RBC # BLD AUTO: 3.95 M/UL (ref 4.21–5.77)
SODIUM SERPL-SCNC: 141 MMOL/L (ref 136–145)
SPECIMEN DESCRIPTION: ABNORMAL
WBC OTHER # BLD: 8.2 K/UL (ref 3.5–11.3)

## 2025-06-02 PROCEDURE — 36415 COLL VENOUS BLD VENIPUNCTURE: CPT

## 2025-06-02 PROCEDURE — 2500000003 HC RX 250 WO HCPCS: Performed by: ORTHOPAEDIC SURGERY

## 2025-06-02 PROCEDURE — 73221 MRI JOINT UPR EXTREM W/O DYE: CPT

## 2025-06-02 PROCEDURE — 94761 N-INVAS EAR/PLS OXIMETRY MLT: CPT

## 2025-06-02 PROCEDURE — 80048 BASIC METABOLIC PNL TOTAL CA: CPT

## 2025-06-02 PROCEDURE — 97110 THERAPEUTIC EXERCISES: CPT

## 2025-06-02 PROCEDURE — 97116 GAIT TRAINING THERAPY: CPT

## 2025-06-02 PROCEDURE — 85025 COMPLETE CBC W/AUTO DIFF WBC: CPT

## 2025-06-02 PROCEDURE — 6360000002 HC RX W HCPCS: Performed by: ORTHOPAEDIC SURGERY

## 2025-06-02 PROCEDURE — 2500000003 HC RX 250 WO HCPCS: Performed by: INTERNAL MEDICINE

## 2025-06-02 PROCEDURE — 6360000002 HC RX W HCPCS: Performed by: INTERNAL MEDICINE

## 2025-06-02 PROCEDURE — 6370000000 HC RX 637 (ALT 250 FOR IP): Performed by: INTERNAL MEDICINE

## 2025-06-02 PROCEDURE — 73218 MRI UPPER EXTREMITY W/O DYE: CPT

## 2025-06-02 PROCEDURE — 86140 C-REACTIVE PROTEIN: CPT

## 2025-06-02 RX ORDER — CEPHALEXIN 500 MG/1
1000 CAPSULE ORAL 2 TIMES DAILY
Qty: 28 CAPSULE | Refills: 0 | Status: SHIPPED | OUTPATIENT
Start: 2025-06-02 | End: 2025-06-09

## 2025-06-02 RX ADMIN — ASPIRIN 81 MG: 81 TABLET, COATED ORAL at 10:25

## 2025-06-02 RX ADMIN — WATER 2000 MG: 1 INJECTION INTRAMUSCULAR; INTRAVENOUS; SUBCUTANEOUS at 04:53

## 2025-06-02 RX ADMIN — CLOPIDOGREL BISULFATE 75 MG: 75 TABLET, FILM COATED ORAL at 10:25

## 2025-06-02 RX ADMIN — Medication 1 CAPSULE: at 10:25

## 2025-06-02 RX ADMIN — LISINOPRIL 20 MG: 20 TABLET ORAL at 10:25

## 2025-06-02 RX ADMIN — METRONIDAZOLE 500 MG: 500 INJECTION, SOLUTION INTRAVENOUS at 14:21

## 2025-06-02 RX ADMIN — WATER 2000 MG: 1 INJECTION INTRAMUSCULAR; INTRAVENOUS; SUBCUTANEOUS at 14:21

## 2025-06-02 RX ADMIN — EMPAGLIFLOZIN 25 MG: 25 TABLET, FILM COATED ORAL at 10:25

## 2025-06-02 RX ADMIN — METRONIDAZOLE 500 MG: 500 INJECTION, SOLUTION INTRAVENOUS at 04:52

## 2025-06-02 RX ADMIN — FOLIC ACID 1 MG: 1 TABLET ORAL at 10:25

## 2025-06-02 RX ADMIN — PANTOPRAZOLE SODIUM 40 MG: 40 TABLET, DELAYED RELEASE ORAL at 10:25

## 2025-06-02 RX ADMIN — SODIUM CHLORIDE, PRESERVATIVE FREE 10 ML: 5 INJECTION INTRAVENOUS at 10:26

## 2025-06-02 RX ADMIN — ENOXAPARIN SODIUM 30 MG: 100 INJECTION SUBCUTANEOUS at 10:26

## 2025-06-02 NOTE — CARE COORDINATION
Case Management Assessment  Initial Evaluation    Date/Time of Evaluation: 6/2/2025 1:39 PM  Assessment Completed by: PRECIOUS Villafuerte    If patient is discharged prior to next notation, then this note serves as note for discharge by case management.    Patient Name: Lloyd Greenberg                   YOB: 1940  Diagnosis: Cellulitis and abscess of hand [L03.119, L02.519]  Cellulitis of hand, right [L03.113]                   Date / Time: 5/30/2025  3:08 PM    Patient Admission Status: Inpatient   Readmission Risk (Low < 19, Mod (19-27), High > 27): Readmission Risk Score: 12.4    Current PCP: Clint Houston MD  PCP verified by CM? Yes    Chart Reviewed: Yes      History Provided by: Patient  Patient Orientation: Alert and Oriented, Person, Place, Situation, Self    Patient Cognition: Alert    Hospitalization in the last 30 days (Readmission):  No    If yes, Readmission Assessment in  Navigator will be completed.    Advance Directives:      Code Status: Full Code   Patient's Primary Decision Maker is: Legal Next of Kin    Primary Decision Maker: Piedad Greenberg - Spouse - [phone]    Discharge Planning:    Patient lives with: Spouse/Significant Other Type of Home: House  Primary Care Giver: Self  Patient Support Systems include: Spouse/Significant Other, Family Members   Current Financial resources: Medicare, [place]  Current community resources: None  Current services prior to admission: Durable Medical Equipment            Current DME: Cane, Walker            Type of Home Care services:  IV Therapy    ADLS  Prior functional level: Independent in ADLs/IADLs  Current functional level: Assistance with the following:, Bathing, Dressing    PT AM-PAC:   /24  OT AM-PAC: 17 /24    Family can provide assistance at DC: Yes  Would you like Case Management to discuss the discharge plan with any other family members/significant others, and if so, who? Yes  Plans to Return to Present Housing:

## 2025-06-02 NOTE — DISCHARGE INSTRUCTIONS
Keep dressing clean and dry.  Daily dressing change as follows:  pack with iodoform gauze followed by sterile dressing with 4 x 4's gauze and Ace wrap.

## 2025-06-02 NOTE — DISCHARGE SUMMARY
Harpreet Boykin M.D.  Internal Medicine Discharge Summary    Patient ID:  Lloyd Greenberg  [id number]  1940    Admission date: 5/30/2025    Discharge date: 6/2/2025     Admitting Physician: Harpreet Boykin MD     Primary Care Physician: Clint Houston MD     Primary Discharge Diagnoses:   Principal Problem:    Cellulitis and abscess of hand  Active Problems:    Diabetes mellitus (HCC)    Hypertension  Resolved Problems:    * No resolved hospital problems. *       Additional Diagnoses:       Diagnosis Date    Arthritis     Diabetes mellitus (HCC)     History of prostate cancer     Hyperlipidemia     Hypertension     Kidney stone     TIA (transient ischemic attack)     Multiple        Hospital Course:   Lloyd Greenberg is a 84 y.o. male who was admitted for Cellulitis and abscess of hand.    Mr. Greenberg injured his right hand on 5/25/25 when the dorsum of his hand was punctured by a rooster willow.  He came to ER for evaluation on 5/26/25.  No labs were ordered but xrays were negative and he DC'd home and was started on Bactrim.  He returned to ER on 5/30/25 due to worsening swelling / pain of the right hand. WBC were normal at 8.2 but CRP was 82.6.  Dr. Fuentes was consulted from ER and performed bedside I/D in the Emergency department draining a large amount of pus and extensively washing out the wounds.  Dr. Fuentes performed repeat I/D with washouts at the bedside on 5/31 and 6/1.  Pt was initially started on Vancomycin and Gentamicin, but due to CKD Gent was DC'd and he was started on Clindamycin.  On 5/31 these were both DC'd and he was started on Ancef and Flagyl.  On 6/1 Erythromycin was added for coverage of campylobacter.   I spoke with Dr. Fuentes who reviewed MRI from today and believes the \"abscess\" remarked upon in the reading is actually iodoform packing that he placed yesterday.  Dr. Fuentes is comfortable with pt being DC'd home today and will f/u this week in the office.   He was deemed medically stable for

## 2025-06-02 NOTE — PROGRESS NOTES
[id number] Dr Fuentes cleaned right hand wound out at bedside using 0.25% Marcaine with epinephrine total of 10 ml injected, washed wound out with saline, packed with iodoform gauze x 2 pieces, wrapped with kerlex and ace wrap, patient tolerated well. Right hand elevated on pillows ok'd per Dr Fuentes. Right arm suspension with gauze discontinued per Dr Fuentes. Ice packed offered for wound area suggested per Dr Fuentes also.  
[id number]  Dr Fuentes at bedside irrigation right hand wound, patient tolerating well, wound packed with 1/4\" iodoform packing after irrigation with sterile saline, sterile laceration pack used per Dr Fuentes, this was after injecting right hand with 20 ml Marcaine 0.25% with epinephrine.   
Dr Ashutosh chau served regarding need for potassium replacement protocol order, received.  
Dr Fuentes at bedside.   
Harpreet Boykin M.D.  Internal Medicine Hospitalist Progress Note    Patient: Lloyd Greenberg  Date of Admission: 5/30/2025  3:08 PM  Date of Evaluation: 6/1/2025      Subjective:  Lloyd Greenberg is a 84 y.o. male who is seen for f/u right dorsal hand cellulitis / abscess secondary to rooster willow puncture.  He underwent bedside I/D with washout in the ER on 5/30 by Dr. Fuentes, and repeat bedside I/D with washout 5/31.  This morning he feels exhausted because he didn't sleep well last night.  He did nap this morning and felt \"dead to the world\".  His hand pain is well controlled except he can feel it when he moves his fingers.  His hand is suspended currently        Review of Systems:  Constitutional:negative  for fevers, and negative for chills.  Respiratory: negative for shortness of breath, negative for cough, and negative for wheezing  Cardiovascular: negative for chest pain, negative for palpitations, and negative for syncope  Gastrointestinal: negative for abdominal pain, negative for nausea,negative for vomiting, negative for diarrhea, negative for constipation, and negative for hematochezia or melena  Genitourinary: negative for dysuria, negative for urinary urgency, negative for urinary frequency, and negative for hematuria  Neurological: negative for unilateral weakness, numbness or tingling.    All other systems were reviewed with the patient and are negative except as stated above      VITALS:  Temp: 98.5 °F (36.9 °C)  BP: (!) 151/81  Respirations: 20  Pulse: 78  SpO2: 95 %    Weight  Wt Readings from Last 3 Encounters:   06/01/25 77.5 kg (170 lb 12.8 oz)   05/15/25 84.4 kg (186 lb)   01/16/25 80.7 kg (178 lb)     Body mass index is 31.24 kg/m².  -----------------------------------------------------------------  EXAM:  GEN:    Awake, alert and oriented x 3.    EYES:  EOMI, pupils equal   NECK: Supple. No lymphadenopathy.  No carotid bruit  CVS:    regular rate and rhythm, no audible murmur  PULM:  CTA, no 
Harpreet Boykin M.D.  Internal Medicine Hospitalist Progress Note    Patient: Lloyd Greenberg  Date of Admission: 5/30/2025  3:08 PM  Date of Evaluation: 6/2/2025      Subjective:  Lloyd Greenberg is a 84 y.o. male who is seen for f/u right dorsal hand cellulitis / abscess secondary to rooster willow puncture.  He underwent bedside I/D with washout in the ER on 5/30 by Dr. Fuentes, and repeat bedside I/D with washout 5/31 and 6/1.  This morning he feels better.  He slept better last night.  His hand pain is well controlled except he can feel it when he moves his fingers.  He did start having some diarrhea last night after starting erythromycin.  He denies any nausea or vomiting.  Tolerating diet.        Review of Systems:  Constitutional:negative  for fevers, and negative for chills.  Respiratory: negative for shortness of breath, negative for cough, and negative for wheezing  Cardiovascular: negative for chest pain, negative for palpitations, and negative for syncope  Gastrointestinal: negative for abdominal pain, negative for nausea,negative for vomiting, negative for diarrhea, negative for constipation, and negative for hematochezia or melena  Genitourinary: negative for dysuria, negative for urinary urgency, negative for urinary frequency, and negative for hematuria  Neurological: negative for unilateral weakness, numbness or tingling.    All other systems were reviewed with the patient and are negative except as stated above      VITALS:  Temp: 97.7 °F (36.5 °C)  BP: (!) 164/60  Respirations: 16  Pulse: 87  SpO2: 96 %    Weight  Wt Readings from Last 3 Encounters:   06/02/25 76.9 kg (169 lb 8 oz)   05/15/25 84.4 kg (186 lb)   01/16/25 80.7 kg (178 lb)     Body mass index is 31 kg/m².  -----------------------------------------------------------------  EXAM:  GEN:    Awake, alert and oriented x 3.    EYES:  EOMI, pupils equal   NECK: Supple. No lymphadenopathy.  No carotid bruit  CVS:    regular rate and rhythm, no 
Occupational Therapy  Facility/Department: [facility] MED SURG  Daily Treatment Note  NAME: Lloyd Greenberg  : 1940  MRN: [id number]    Date of Service: 2025    Discharge Recommendations:  Continue to assess pending progress, Home with assist PRN         Patient Diagnosis(es): The encounter diagnosis was Cellulitis of hand, right.     Assessment   Activity Tolerance: Patient tolerated treatment well     Plan       Restrictions       Subjective  Subjective  Subjective: Patient reported he was injured by a chicken claw and the wound was deeper than he thought  Orientation  Overall Orientation Status: Within Normal Limits  Pain: didn't report          Objective  Vitals     Bed Mobility Training  Bed Mobility Training: Yes  Overall Level of Assistance: Stand by assistance  Interventions: Safety awareness training  Rolling: Stand by assistance  Supine to Sit: Stand by assistance  Sit to Supine: Stand by assistance  Scooting: Stand by assistance  Balance  Sitting: Intact  Standing: Intact (appeared intact this date but reported he does have dizziness upon standing)  Transfer Training  Transfer Training: Yes  Overall Level of Assistance: Stand by assistance  Interventions: Safety awareness training  Sit to Stand: Stand by assistance  Stand to Sit: Stand by assistance  Gait  Gait Training: Yes  Overall Level of Assistance: Contact guard assistance  Distance (ft): 160 Feet  Assistive Device: Cane, straight  Interventions: Safety awareness training  Speed/Jazz: Slow  Step Length: Left shortened;Right shortened     ADL  Putting On/Taking Off Footwear: Moderate assistance  Putting On/Taking Off Footwear Skilled Clinical Factors: to ambrose/doff socks. Mod I to doff, Mod A to ambrose d/t bandage on R hand so limited ability to adjust  Functional Mobility: Stand by assistance  Additional Comments: patient declined additional ADL tasks - Therapist educated patient to ambrose R arm first, wear shirts with large sleeves d/t size of 
Occupational Therapy  Facility/Department: [facility] MED SURG  Daily Treatment Note  NAME: Lloyd Greenberg  : 1940  MRN: [id number]    Date of Service: 2025    Discharge Recommendations:  Continue to assess pending progress, Home with assist PRN         Patient Diagnosis(es): The encounter diagnosis was Cellulitis of hand, right.     Assessment   Activity Tolerance: Patient tolerated treatment well  Discharge Recommendations: Continue to assess pending progress;Home with assist PRN     Plan  Occupational Therapy Plan  Times Per Day: Once a day  Days Per Week: 7 Days  Current Treatment Recommendations: Strengthening;ROM;Functional mobility training;Endurance training;Safety education & training;Patient/Caregiver education & training;Equipment evaluation, education, & procurement;Return to work related activity;Self-Care / ADL    Restrictions   General Fall Risk, no lifting with R hand     Subjective  Subjective  Subjective: Pt sitting upright in bed and agreeable to therrex. denying ADLs and denying sitting in chair.  Pain: Pt reports minimal pain in R hand, but states that has been normal throughout this.  Orientation  Overall Orientation Status: Within Normal Limits  Orientation Level: Oriented X4  Pain: Slight discomfort at rest in R wrist, but doesn't rate.          Objective  Vitals        OT Exercises  Exercise Treatment: To facilitate increased endurance BUE ther ex patient completed 15 reps x 5 planes x 1 set demo'ing good tolerance by no request for RBs.     Safety Devices  Type of Devices: All fall risk precautions in place;Call light within reach;Left in bed;All amy prominences offloaded    Patient Education  Education Given To: Patient  Education Provided: Role of Therapy;Plan of Care;Precautions;ADL Adaptive Strategies;Transfer Training  Education Method: Demonstration;Verbal  Education Outcome: Verbalized understanding;Continued education needed    Goals  Short Term Goals  Time Frame for Short 
Ortho saw pt at bedside, patient is okat for d/c from their standpoint, patient to do daily dressing changes at home and follow up in office on Wednesday.   
Patient assessment and vitals completed as charted. Patient A&OX4 and cooperative with assessment. Patient resting comfortably at this time, states some soreness in his hand, pain medications administered for and denies any other needs. Call light within patients reach and bed alarm on. Plan of care ongoing.  
Patient doing well resting comfortably in bed.  Dressing was torn down to the right hand iodoform packing was removed.  No bleeding wounds appear pink dry no drainage is noted.  Appears swollen still  with  some erythema to the dorsal aspect of the hand.  This is improved.  Patient denies having any severe pain complains of some soreness continues with good  sensation throughout the entire hand and wrist as well as hand he has good range of motion of all digits slight decrease in range motion secondary to swelling of the hand.  Overall very happy with the amount of progression the patient is show we will plan for patient to be discharged today.   Culture and sensitivity  show susceptibility to ceftriaxone recommend patient to continue outpatient 10 days 2 tabs in the morning 2 tabs in the evening.  We will follow up with the patient Wednesday afternoon please arrange appointment prior to patient being discharged.    The MRI of the hand or wrist was reviewed as well there was a question of an abscess to the dorsal area of the hand, this does correlate with the amount of iodoform packing that is in the patient's hand.  Dr. Ferny Fuentes personally reviewed these images in his agreeable that at this abscess is the iodoform gauze in the same location as the MRI.  No abscess is present.  Patient will have close follow up outpatient continue with oral antibiotics  
Patient in bed with right hand elevated/suspended per Dr Fuentes for swelling, vitals and assessment initiated, whiteboard updated, call light within reach,  
Patient is leaving the floor at this time. Patient's family is driving him home. Patient discharged.   
Patient resting comfortably tonight no complaints feeling better.  Patient wiggling his fingers and wrist much better tonight.  I checked cultures.  The gram-negative rods appear to be E. coli.  Sensitivities pending.  Clinically patient doing much better tonight.  Patient will continue with close observation.  Await culture and sensitivities.  Await MRI results  
Patient seen after surgery this afternoon.  Patient feeling much better.  Patient ate lunch today.  Patient currently doing well.    Cultures reviewed.  There is evidence of significant amount of gram-negative rods.  Patient had discussion initially with pharmacy and we were considering clindamycin due to his renal function, however, now with the newfound knowledge of the gram-negative rods we will make plans to use Ancef 2 g every 8 hours and metronidazole 500 mg every 8 hours.  Will await cultures and sensitivities.  Patient has been advised that there is a potential cross reaction with penicillin allergy.  Patient understands risk and we have discussed this with pharmacy who feels that in view of his kidney function and low cross-reactivity that cephalosporin would be an good choice for this patient.    Will start antibiotics with plans for close follow-up    Will monitor CBC sed rate C-reactive protein in the morning  
Patients assessment and vitals initiated, whiteboard updated, call light within reach, right hand/arm remains suspended with gauze per IV pole with right elbow resting on bed as ordered per Dr Fuentes.  
Physical Therapy  Facility/Department: [facility] MED SURG  Daily Treatment Note  NAME: Lloyd Greenberg  : 1940  MRN: [id number]    Date of Service: 2025    Discharge Recommendations:  Continue to assess pending progress, Home independently, Home with assist PRN     Patient Diagnosis(es): The encounter diagnosis was Cellulitis of hand, right.    Assessment  Assessment: Bed mobility:SBA. Transfers:SBA. Commode use and transfer--SBA. Pt ambulated [id number] without AD and used HR in hallway when needed. Pt declined use of cane. Demoes slow cadance and decreased B LE step length. No LOB or unsteadiness. Pt completed seated EOB B LE therex x15-20 in all planes of motion.  Activity Tolerance: Patient tolerated treatment well    Plan  Physical Therapy Plan  General Plan: 2 times a day 7 days a week  Specific Instructions for Next Treatment: once per day on weekends and holidays  Current Treatment Recommendations: Strengthening;ROM;Balance training;Functional mobility training;Transfer training;Endurance training;Gait training;Stair training;Neuromuscular re-education;Pain management;Home exercise program;Therapeutic activities    Restrictions  Restrictions/Precautions  Restrictions/Precautions: NPO, Fall Risk     Subjective   Subjective  Subjective: Pt in bed upon arrival, agreeable to therapy at this time  Pain: denies    Objective  Bed Mobility Training  Bed Mobility Training: Yes  Overall Level of Assistance: Stand by assistance  Interventions: Safety awareness training  Rolling: Stand by assistance  Supine to Sit: Stand by assistance  Scooting: Stand by assistance  Transfer Training  Transfer Training: Yes  Overall Level of Assistance: Stand by assistance  Interventions: Safety awareness training  Sit to Stand: Stand by assistance  Stand to Sit: Stand by assistance  Toilet Transfer: Stand by assistance  Gait  Gait Training: Yes  Overall Level of Assistance: Stand by assistance;Contact guard assistance  Distance 
Physical Therapy  Facility/Department: [facility] MED SURG  Daily Treatment Note  NAME: Lloyd Greenberg  : 1940  MRN: [id number]    Date of Service: 2025    Discharge Recommendations:  Continue to assess pending progress, Home independently, Home with assist PRN     Patient Diagnosis(es): The encounter diagnosis was Cellulitis of hand, right.    Assessment  Assessment: Bed mobility: SUP. Transfers:SBA. Pt ambulates for 160 ft with no AD and SBA/ CGA with no LOB but occasional unsteadiness. Pt completes seated B LE exercises x20 reps.  Activity Tolerance: Patient tolerated treatment well    Plan  Physical Therapy Plan  General Plan: 2 times a day 7 days a week  Specific Instructions for Next Treatment: once per day on weekends and holidays  Current Treatment Recommendations: Strengthening;ROM;Balance training;Functional mobility training;Transfer training;Endurance training;Gait training;Stair training;Neuromuscular re-education;Pain management;Home exercise program;Therapeutic activities    Restrictions  Restrictions/Precautions  Restrictions/Precautions: NPO, Fall Risk     Subjective   Subjective  Subjective: Pt in bed upon arrival, agreeable to therapy at this time.  Pain: Slight discomfort at rest in R wrist, but doesn't rate.    Objective  Bed Mobility Training  Bed Mobility Training: Yes  Overall Level of Assistance: Supervision  Interventions: Safety awareness training  Rolling: Supervision  Supine to Sit: Supervision  Sit to Supine: Supervision  Scooting: Supervision  Transfer Training  Transfer Training: Yes  Overall Level of Assistance: Stand by assistance  Interventions: Safety awareness training  Sit to Stand: Stand by assistance  Stand to Sit: Stand by assistance  Toilet Transfer: Stand by assistance  Gait  Gait Training: Yes  Overall Level of Assistance: Stand by assistance;Contact guard assistance  Distance (ft): 160 Feet  Assistive Device: None  Interventions: Safety awareness 
Pt ambulates to bathroom, incontinent of loose stool. PCT assists pt with hygiene care. Pt is sitting at edge of bed. Reminded pt to elevate right hand. Provided with ice bag. Call light in reach.   
Pt arrived to room 319 vie ER cart. Pt ambulated independently to room bed at this time, tolerated well. Pt vitals and assessment completed at this time. R arm at 90 degree angle per Dr Fuentes. Pt denies any pain at this time. Pt A&Ox4, on RA. Navigator completed at this time, home medications reviewed at this time as well. Pt aware that he will be NPO at midnight. All needs met at this time, call light within reach. Care ongoing.  
Pt is a/o x4 and denies any pain. Pt states he feels slight pain with movement to RH. VS and assessment complete. RH is elevated on pillows and above head. Pt states he ambulates without difficulty or loss of balance. Reviewed fall safety and states he will use call light for assistance. Call light in  reach.   
Reviewed discharge instructions with patient and spouse.   Patient aware of need to  new prescription for Keflex.   Reviewed new medications and side effects to monitor for.  Reviewed home medications and when next dose is due.  Patient aware of date/time of follow up appointments.   Educated patient on importance of following a carb control diet to keep sugars under control for wound healing.   Instructed to keep dressing to right hand clean, dry, and intact and to change right hand dressing daily.  Writer instructed patient and wife of how to change dressing.  Dressing supplies sent home with patient.  Educational handouts given on Cellulitis and Skin Abscess.   Questions answered.   Verbalizes understanding.  Copy of discharge instructions given to patient.  
Vitals and assessment complete at this time. See flowsheets for details. Patient is resting in bed. Patient is A&O x4. Breathing is regular and unlabored. Lung sounds are clear. Dressing to RUE is CDI. Patient denies numbness/tingling. Patient is able to move RUE and fingers. RUE is warm, skin is red, cap refill is WNL. Writer assisted patient with cutting up food on breakfast tray. Patient is aware of plan for MRI today. Patient denies further needs. Call light is within reach. Care ongoing.   
training  Speed/Jazz: Slow  Step Length: Left shortened;Right shortened  PT Exercises  Exercise Treatment: seated EOB B LE therex x15-20 in all planes of motion  Safety Devices  Type of Devices: All amy prominences offloaded;All fall risk precautions in place;Call light within reach;Left in bed (left patient seated EOB with RN aware)      Goals  Short Term Goals  Time Frame for Short Term Goals: 20 days  Short Term Goal 1: Pt to tolerate 20 minutes of ther ex to facilitate return to PLOF.  Short Term Goal 2: Pt to ambulate 300ft with standard cane or no AD while maintaining R UE against body to increase ambulatory capacity for d/c  Short Term Goal 3: Pt to complete transfers IND to demonstrate increased independacne for safety with d/c.  Patient Goals   Patient Goals : Pt goal to return home    Education  Patient Education  Education Given To: Patient  Education Provided: Role of Therapy;Plan of Care;Transfer Training  Education Method: Demonstration  Barriers to Learning: Hearing  Education Outcome: Verbalized understanding;Demonstrated understanding    Therapy Time   Individual Concurrent Group Co-treatment   Time In 0853         Time Out 0918         Minutes 25           Beth Coello, PTA          
     Bed mobility  Rolling to Right: Supervision  Supine to Sit: Supervision  Sit to Supine: Supervision  Scooting: Supervision  Transfers  Sit to Stand: Stand by assistance  Stand to Sit: Stand by assistance  Ambulation  WB Status: fwb  Ambulation  Surface: Level tile  Device: Single point cane  Assistance: Stand by assistance  Quality of Gait: Pt ambulated 150ft with standard cane while keeping R UE against body, no LOB, slowed ava, mild SOB.     Balance  Posture: Fair  Sitting - Static: Good  Sitting - Dynamic: Good  Standing - Static: Good;-  Standing - Dynamic: Good;-          AM-PAC - Mobility         AM-PAC Mobility without Stair Climbing Inpatient   How much difficulty turning over in bed?: A Little  How much difficulty sitting down on / standing up from a chair with arms?: A Little  How much difficulty moving from lying on back to sitting on side of bed?: A Little  How much help from another person moving to and from a bed to a chair?: A Little  How much help from another person needed to walk in hospital room?: A Little  AM-PAC Inpatient Mobility without Stair Climbing Raw Score : 15  AM-PAC Inpatient without Stair Climbing T-Scale Score : 43.03  Mobility Inpatient CMS 0-100% Score: 47.43  Mobility Inpatient without Stair CMS G-Code Modifier : CK    Goals  Short Term Goals  Time Frame for Short Term Goals: 20 days  Short Term Goal 1: Pt to tolerate 20 minutes of ther ex to facilitate return to PLOF.  Short Term Goal 2: Pt to ambulate 300ft with standard cane or no AD while maintaining R UE against body to increase ambulatory capacity for d/c  Short Term Goal 3: Pt to complete transfers IND to demonstrate increased independacne for safety with d/c.  Patient Goals   Patient Goals : Pt goal to return home       Education  Patient Education  Education Given To: Patient  Education Provided Comments: Pt edu: PT POC      Therapy Time   Individual Concurrent Group Co-treatment   Time In 0824         Time Out 
Outcomes: None Identified     Physical Signs/Symptoms Outcomes: Biochemical Data, Weight, Nutrition Focused Physical Findings, Meal Time Behavior, GI Status, Fluid Status or Edema    Discharge Planning:    Continue current diet     Patricia Stanley  Contact: [phone]     
to planned procedure this date. Patient to keep RUE elevated.)  ADL  Feeding:  (Not observed at initial evaluation, pt NPO)  Grooming: Supervision;Stand by assistance  UE Bathing: Contact guard assistance  LE Bathing: Contact guard assistance  UE Dressing: Minimal assistance;Moderate assistance  LE Dressing: Contact guard assistance;Minimal assistance (Mya reports unable to zip/unzip fasteners at initial ER visit.)  Putting On/Taking Off Footwear: Stand by assistance;Contact guard assistance  Toileting: Supervision  Functional Mobility: Stand by assistance (with cane)                 Orientation  Overall Orientation Status: Within Normal Limits  Orientation Level: Oriented X4                  Education Given To: Patient  Education Provided: Role of Therapy;Plan of Care;Precautions  Education Method: Demonstration;Verbal  Education Outcome: Verbalized understanding;Continued education needed                 OutComes Score  AM-PAC - ADL  AM-PAC Daily Activity - Inpatient   How much help is needed for putting on and taking off regular lower body clothing?: A Lot  How much help is needed for bathing (which includes washing, rinsing, drying)?: A Little  How much help is needed for toileting (which includes using toilet, bedpan, or urinal)?: None  How much help is needed for putting on and taking off regular upper body clothing?: A Lot  How much help is needed for taking care of personal grooming?: A Little  How much help for eating meals?: A Little  AM-[facility] Inpatient Daily Activity Raw Score: 17  AM-PAC Inpatient ADL T-Scale Score : 37.26  ADL Inpatient CMS 0-100% Score: 50.11  ADL Inpatient CMS G-Code Modifier : CK    Goals  Short Term Goals  Time Frame for Short Term Goals: 21 days  Short Term Goal 1: Patient to complete ADL routine c mod I with use of AE/DME as needed to improve safety in preparation for discharge.  Short Term Goal 2: Patient to complete UB bathing/dressing with required modifications and

## 2025-06-02 NOTE — DISCHARGE INSTR - DIET

## (undated) DEVICE — CANNULA ORAL NSL AD CO2 N INTUB O2 DEL DISP TRU LNK

## (undated) DEVICE — CATHETER DIL 6FR L180CM BLLN INFLATED 36-40.5-45FR L8CM ES

## (undated) DEVICE — SOLUTION IV IRRIG POUR BRL 0.9% SODIUM CHL 2F7124

## (undated) DEVICE — MEDI-VAC NON-CONDUCTIVE TUBING7MM X 30.5 (100FT): Brand: CARDINAL HEALTH

## (undated) DEVICE — SYRINGE INFL 60ML DISP ALLIANCE II

## (undated) DEVICE — FORCEPS BX L240CM JAW DIA2.4MM ORNG L CAP W/ NDL DISP RAD